# Patient Record
Sex: FEMALE | Race: WHITE | Employment: FULL TIME | URBAN - METROPOLITAN AREA
[De-identification: names, ages, dates, MRNs, and addresses within clinical notes are randomized per-mention and may not be internally consistent; named-entity substitution may affect disease eponyms.]

---

## 2017-01-18 ENCOUNTER — HOSPITAL ENCOUNTER (OUTPATIENT)
Dept: WOMENS IMAGING | Age: 47
Discharge: HOME | End: 2017-01-18
Attending: NURSE PRACTITIONER | Admitting: NURSE PRACTITIONER

## 2017-01-18 DIAGNOSIS — Z12.31 VISIT FOR SCREENING MAMMOGRAM: ICD-10-CM

## 2017-08-30 ENCOUNTER — HOSPITAL ENCOUNTER (OUTPATIENT)
Dept: GENERAL RADIOLOGY | Age: 47
Discharge: HOME | End: 2017-08-30

## 2017-08-30 DIAGNOSIS — M95.4 STERNAL DEFORMITY: ICD-10-CM

## 2017-10-25 ENCOUNTER — HOSPITAL ENCOUNTER (OUTPATIENT)
Dept: ULTRASOUND IMAGING | Age: 47
Discharge: HOME | End: 2017-10-25
Attending: NURSE PRACTITIONER | Admitting: NURSE PRACTITIONER

## 2017-10-25 DIAGNOSIS — M95.4 ACQUIRED DEFORMITY OF CHEST AND RIB: ICD-10-CM

## 2017-10-25 DIAGNOSIS — M95.4: ICD-10-CM

## 2018-03-29 ENCOUNTER — HOSPITAL ENCOUNTER (OUTPATIENT)
Dept: ULTRASOUND IMAGING | Age: 48
Discharge: HOME | End: 2018-03-29
Attending: NURSE PRACTITIONER | Admitting: NURSE PRACTITIONER

## 2018-03-29 DIAGNOSIS — R10.9 RIGHT FLANK PAIN: ICD-10-CM

## 2018-07-05 ENCOUNTER — HOSPITAL ENCOUNTER (OUTPATIENT)
Dept: GENERAL RADIOLOGY | Age: 48
Discharge: HOME | End: 2018-07-05
Attending: NURSE PRACTITIONER | Admitting: NURSE PRACTITIONER

## 2018-07-05 DIAGNOSIS — M54.50 MIDLINE LOW BACK PAIN WITHOUT SCIATICA, UNSPECIFIED CHRONICITY: ICD-10-CM

## 2018-07-19 ENCOUNTER — OFFICE VISIT (OUTPATIENT)
Dept: CARDIOLOGY CLINIC | Age: 48
End: 2018-07-19

## 2018-07-19 VITALS
SYSTOLIC BLOOD PRESSURE: 136 MMHG | BODY MASS INDEX: 46.93 KG/M2 | HEART RATE: 70 BPM | WEIGHT: 255 LBS | DIASTOLIC BLOOD PRESSURE: 82 MMHG | HEIGHT: 62 IN

## 2018-07-19 DIAGNOSIS — I27.20 PULMONARY HTN (HCC): ICD-10-CM

## 2018-07-19 DIAGNOSIS — R07.89 OTHER CHEST PAIN: ICD-10-CM

## 2018-07-19 DIAGNOSIS — I36.1 NON-RHEUMATIC TRICUSPID VALVE INSUFFICIENCY: ICD-10-CM

## 2018-07-19 DIAGNOSIS — R07.9 CHEST PAIN, UNSPECIFIED TYPE: Primary | ICD-10-CM

## 2018-07-19 PROCEDURE — 99213 OFFICE O/P EST LOW 20 MIN: CPT | Performed by: NURSE PRACTITIONER

## 2018-07-19 PROCEDURE — 93000 ELECTROCARDIOGRAM COMPLETE: CPT | Performed by: NURSE PRACTITIONER

## 2018-07-19 NOTE — PROGRESS NOTES
CARDIOLOGY  NOTE      7/19/2018    RE: Kym Reinoso  (1970)                               TO:  Dr. Ed Trinh MD      Thank you for involving me in taking care of your  patient Kym Reinoso, who is a  50y.o. year old female with past medical  history of  Chest pain. She is seen today for chest pain . She during this visit she complains of chest pain. She woke up on Saturday and had left sided chest pain. She describes it as a sharp pain that radiated to her jaw. It went away on its own. She notes since she has been tired. There have no further episodes. She notes she is congested as well. There is a family of history of CAD-father with MI, brother with MI early age. sister had CABG at age of 43. EKG today NSR no acute changes. Vitals:    07/19/18 1049   BP: 136/82   Pulse: 70       Current Outpatient Prescriptions   Medication Sig Dispense Refill    Multiple Vitamins-Minerals (MULTI COMPLETE PO) Take by mouth      Fluticasone Propionate (FLONASE NA) by Nasal route      cetirizine (ZYRTEC) 10 MG tablet Take 10 mg by mouth daily.  Omega-3 Fatty Acids (FISH OIL BURP-LESS PO) Take  by mouth.  sertraline (ZOLOFT) 50 MG tablet Take 50 mg by mouth daily.  omeprazole (PRILOSEC) 40 MG capsule Take 40 mg by mouth daily. No current facility-administered medications for this visit. Allergies: Tape [adhesive tape] and Ibuprofen  Past Medical History:   Diagnosis Date    Anemia     Anesthesia     Trouble waking up.     Arthritis     right knee-S/p Orthovisc injections X3    Blood pressure elevated without history of HTN 11/14/2013    per [address] visit- 124/90, 124/92    Blood transfusion Last 6/2011    No reaction    Cardiomegaly 10/13/2012    per CT chest-in Epic    Chronic sinusitis     Depression     Environmental allergies     Family history of early CAD     Sister- CABG- age 39; Father- Massive MI- age 43    H/O cardiovascular stress test 6/10/05     EF 69% Normal study.  H/O echocardiogram ; EF55% mild-mod MR,TR, mod pulm htn 2005/ EF 52%,Mild MR and TR    H/O gastric bypass     H/O hypoglycemia     History of cardiac cath     No signif.CAD.    HX OTHER MEDICAL 13    EVENT MONITOR: -Sinus rhythm, sinus bradycardia. 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  Seasonal allergies      Past Surgical History:   Procedure Laterality Date     SECTION  1996    x 1    CHOLECYSTECTOMY      Lap Choley    COLONOSCOPY      DIAGNOSTIC CARDIAC CATH LAB PROCEDURE  8/15/05    2005, No significant CAD    ENDOSCOPY, COLON, DIAGNOSTIC      GASTRIC BYPASS SURGERY  2006    pre-wt 341#lb    HYSTERECTOMY  8/15/14    LAVH with Right S and O    KNEE ARTHROSCOPY Right 14    medial/lateral menisectomy, chondroplasty    KNEE SURGERY      right - centered knee cap and scrapes bone spurs and arthritis.     WRIST SURGERY  2008    Left - fractured x 2     Family History   Problem Relation Age of Onset    Heart Disease Mother     Diabetes Mother     Cancer Mother         lung    Heart Disease Father         Massive MI    Early Death Father 43        Massive MI    Other Sister         pulmonary HTN, O2 ATC    Diabetes Sister     Heart Disease Sister 39        CABG at age 43    Cancer Brother 43        stomach - remission    Heart Disease Brother         MI, CABG at age 45   Kayla Slimmer High Cholesterol Brother     High Blood Pressure Brother     Pacemaker Brother         defibulator    Diabetes Daughter     High Blood Pressure Daughter     Other Daughter         Bladder reflux     Social History   Substance Use Topics    Pulmonary HTN and mild to moderate TR - Will check echo to eval progression    Family history  First degree relatives with early age CAD/ MI     Lifestyle modification, diet and exercise advised    F/U after testing

## 2018-08-01 ENCOUNTER — HOSPITAL ENCOUNTER (OUTPATIENT)
Dept: OTHER | Age: 48
Discharge: HOME | End: 2018-08-31
Attending: PREVENTIVE MEDICINE | Admitting: PHYSICAL MEDICINE & REHABILITATION

## 2018-08-02 ENCOUNTER — PROCEDURE VISIT (OUTPATIENT)
Dept: CARDIOLOGY CLINIC | Age: 48
End: 2018-08-02

## 2018-08-02 DIAGNOSIS — I27.20 PULMONARY HTN (HCC): ICD-10-CM

## 2018-08-02 DIAGNOSIS — R07.9 CHEST PAIN, UNSPECIFIED TYPE: Primary | ICD-10-CM

## 2018-08-02 LAB
LV EF: 58 %
LVEF MODALITY: NORMAL

## 2018-08-02 PROCEDURE — 93306 TTE W/DOPPLER COMPLETE: CPT | Performed by: INTERNAL MEDICINE

## 2018-08-07 ENCOUNTER — TELEPHONE (OUTPATIENT)
Dept: CARDIOLOGY CLINIC | Age: 48
End: 2018-08-07

## 2018-08-07 NOTE — TELEPHONE ENCOUNTER
Spoke with pt and gave echo results      Summary   Left ventricular systolic function is normal with an ejection fraction of   55-60%.  Grade I diastolic dysfunction.   Sclerotic, but non-stenotic aortic valve.   Doppler evaluation reveals mild mitral and mild to moderate tricuspid   regurgitation.   Right ventricular systolic pressure of 48 mmHg consistent with mild to   moderate pulmonary hypertension.   No evidence of pericardial effusion.

## 2018-08-09 ENCOUNTER — PROCEDURE VISIT (OUTPATIENT)
Dept: CARDIOLOGY CLINIC | Age: 48
End: 2018-08-09

## 2018-08-09 DIAGNOSIS — I27.20 PULMONARY HTN (HCC): ICD-10-CM

## 2018-08-09 DIAGNOSIS — R07.9 CHEST PAIN, UNSPECIFIED TYPE: ICD-10-CM

## 2018-08-09 LAB
LV EF: 68 %
LVEF MODALITY: NORMAL

## 2018-08-09 PROCEDURE — 93018 CV STRESS TEST I&R ONLY: CPT | Performed by: INTERNAL MEDICINE

## 2018-08-09 PROCEDURE — A9500 TC99M SESTAMIBI: HCPCS | Performed by: INTERNAL MEDICINE

## 2018-08-09 PROCEDURE — 78452 HT MUSCLE IMAGE SPECT MULT: CPT | Performed by: INTERNAL MEDICINE

## 2018-08-09 PROCEDURE — 93016 CV STRESS TEST SUPVJ ONLY: CPT | Performed by: INTERNAL MEDICINE

## 2018-08-09 PROCEDURE — 93017 CV STRESS TEST TRACING ONLY: CPT | Performed by: INTERNAL MEDICINE

## 2018-08-14 ENCOUNTER — TELEPHONE (OUTPATIENT)
Dept: CARDIOLOGY CLINIC | Age: 48
End: 2018-08-14

## 2018-08-14 NOTE — TELEPHONE ENCOUNTER
Pt advised to keep appt on 08/23/2018 to go over abnormal nm      Conclusions   Giovanna Herrmann physician Dr. Dinora Palafox .abnormal stress test, normal LVEF,    Myocardial perfusion scan shows moderate size, moderate intensity, partially    reversible perfusion defect in anterior wall noted. Breast tissue attenuation    artifact cannot be excluded.        Recommendation    OFFICE VISIT TO DISCUSS RESULTS

## 2018-08-15 ENCOUNTER — TELEPHONE (OUTPATIENT)
Dept: CARDIOLOGY CLINIC | Age: 48
End: 2018-08-15

## 2018-08-23 ENCOUNTER — OFFICE VISIT (OUTPATIENT)
Dept: CARDIOLOGY CLINIC | Age: 48
End: 2018-08-23

## 2018-08-23 VITALS
DIASTOLIC BLOOD PRESSURE: 80 MMHG | HEIGHT: 62 IN | HEART RATE: 72 BPM | BODY MASS INDEX: 47.73 KG/M2 | WEIGHT: 259.4 LBS | SYSTOLIC BLOOD PRESSURE: 122 MMHG

## 2018-08-23 DIAGNOSIS — R07.89 OTHER CHEST PAIN: Primary | ICD-10-CM

## 2018-08-23 PROCEDURE — 99214 OFFICE O/P EST MOD 30 MIN: CPT | Performed by: INTERNAL MEDICINE

## 2018-08-23 PROCEDURE — 1036F TOBACCO NON-USER: CPT | Performed by: INTERNAL MEDICINE

## 2018-08-23 PROCEDURE — G8417 CALC BMI ABV UP PARAM F/U: HCPCS | Performed by: INTERNAL MEDICINE

## 2018-08-23 PROCEDURE — G8427 DOCREV CUR MEDS BY ELIG CLIN: HCPCS | Performed by: INTERNAL MEDICINE

## 2018-08-28 ENCOUNTER — HOSPITAL ENCOUNTER (OUTPATIENT)
Dept: GENERAL RADIOLOGY | Age: 48
Discharge: HOME | End: 2018-08-28
Attending: INTERNAL MEDICINE | Admitting: INTERNAL MEDICINE

## 2018-08-28 DIAGNOSIS — Z01.818 PREOP TESTING: ICD-10-CM

## 2018-08-28 LAB
ANION GAP SERPL CALCULATED.3IONS-SCNC: 14 MMOL/L (ref 4–16)
APTT: 26.1 SECONDS (ref 21.2–33)
BASOPHILS ABSOLUTE: 0.1 K/CU MM
BASOPHILS RELATIVE PERCENT: 1.2 % (ref 0–1)
BUN BLDV-MCNC: 10 MG/DL (ref 6–23)
CALCIUM SERPL-MCNC: 8.6 MG/DL (ref 8.3–10.6)
CHLORIDE BLD-SCNC: 102 MMOL/L (ref 99–110)
CO2: 24 MMOL/L (ref 21–32)
CREAT SERPL-MCNC: 0.7 MG/DL (ref 0.6–1.1)
DIFFERENTIAL TYPE: ABNORMAL
EOSINOPHILS ABSOLUTE: 0.3 K/CU MM
EOSINOPHILS RELATIVE PERCENT: 6.2 % (ref 0–3)
GFR AFRICAN AMERICAN: >60 ML/MIN/1.73M2
GFR NON-AFRICAN AMERICAN: >60 ML/MIN/1.73M2
GLUCOSE BLD-MCNC: 88 MG/DL (ref 70–99)
HCT VFR BLD CALC: 39.9 % (ref 37–47)
HEMOGLOBIN: 12.3 GM/DL (ref 12.5–16)
IMMATURE NEUTROPHIL %: 0.4 % (ref 0–0.43)
INR BLD: 0.91 INDEX
LYMPHOCYTES ABSOLUTE: 1.5 K/CU MM
LYMPHOCYTES RELATIVE PERCENT: 29 % (ref 24–44)
MCH RBC QN AUTO: 28.1 PG (ref 27–31)
MCHC RBC AUTO-ENTMCNC: 30.8 % (ref 32–36)
MCV RBC AUTO: 91.3 FL (ref 78–100)
MONOCYTES ABSOLUTE: 0.4 K/CU MM
MONOCYTES RELATIVE PERCENT: 6.8 % (ref 0–4)
NUCLEATED RBC %: 0 %
PDW BLD-RTO: 15.2 % (ref 11.7–14.9)
PLATELET # BLD: 221 K/CU MM (ref 140–440)
PMV BLD AUTO: 9.8 FL (ref 7.5–11.1)
POTASSIUM SERPL-SCNC: 4.5 MMOL/L (ref 3.5–5.1)
PROTHROMBIN TIME: 10.4 SECONDS (ref 9.12–12.5)
RBC # BLD: 4.37 M/CU MM (ref 4.2–5.4)
SEGMENTED NEUTROPHILS ABSOLUTE COUNT: 2.9 K/CU MM
SEGMENTED NEUTROPHILS RELATIVE PERCENT: 56.4 % (ref 36–66)
SODIUM BLD-SCNC: 140 MMOL/L (ref 135–145)
TOTAL IMMATURE NEUTOROPHIL: 0.02 K/CU MM
TOTAL NUCLEATED RBC: 0 K/CU MM
WBC # BLD: 5.2 K/CU MM (ref 4–10.5)

## 2018-09-01 ENCOUNTER — HOSPITAL ENCOUNTER (OUTPATIENT)
Dept: OTHER | Age: 48
Discharge: HOME | End: 2018-09-01
Attending: PHYSICAL MEDICINE & REHABILITATION | Admitting: PHYSICAL MEDICINE & REHABILITATION

## 2018-09-11 ENCOUNTER — OFFICE VISIT (OUTPATIENT)
Dept: CARDIOLOGY CLINIC | Age: 48
End: 2018-09-11

## 2018-09-11 VITALS
BODY MASS INDEX: 47.04 KG/M2 | WEIGHT: 255.6 LBS | HEIGHT: 62 IN | DIASTOLIC BLOOD PRESSURE: 96 MMHG | SYSTOLIC BLOOD PRESSURE: 122 MMHG | HEART RATE: 96 BPM

## 2018-09-11 DIAGNOSIS — E66.01 OBESITY, MORBID (HCC): ICD-10-CM

## 2018-09-11 DIAGNOSIS — I50.30 DIASTOLIC CONGESTIVE HEART FAILURE, UNSPECIFIED HF CHRONICITY (HCC): Primary | ICD-10-CM

## 2018-09-11 PROCEDURE — 1036F TOBACCO NON-USER: CPT | Performed by: INTERNAL MEDICINE

## 2018-09-11 PROCEDURE — G8417 CALC BMI ABV UP PARAM F/U: HCPCS | Performed by: INTERNAL MEDICINE

## 2018-09-11 PROCEDURE — G8427 DOCREV CUR MEDS BY ELIG CLIN: HCPCS | Performed by: INTERNAL MEDICINE

## 2018-09-11 PROCEDURE — 99214 OFFICE O/P EST MOD 30 MIN: CPT | Performed by: INTERNAL MEDICINE

## 2018-09-11 RX ORDER — POTASSIUM CHLORIDE 1.5 G/1.77G
20 POWDER, FOR SOLUTION ORAL 2 TIMES DAILY
Qty: 30 EACH | Refills: 3 | Status: SHIPPED | OUTPATIENT
Start: 2018-09-11 | End: 2018-11-16 | Stop reason: SDUPTHER

## 2018-09-11 RX ORDER — FUROSEMIDE 20 MG/1
20 TABLET ORAL 2 TIMES DAILY
Qty: 120 TABLET | Refills: 3 | Status: SHIPPED | OUTPATIENT
Start: 2018-09-11 | End: 2018-11-16 | Stop reason: SDUPTHER

## 2018-09-11 RX ORDER — PHENTERMINE HYDROCHLORIDE 37.5 MG/1
37.5 TABLET ORAL
Qty: 30 TABLET | Refills: 0 | Status: SHIPPED | OUTPATIENT
Start: 2018-09-11 | End: 2018-10-22 | Stop reason: SDUPTHER

## 2018-09-11 NOTE — PROGRESS NOTES
Phares Gosselin, MD        OFFICE  FOLLOWUP NOTE    Chief complaints: patient is here for management of diastolic CHF, pulmonary HTN, obesity,migraine,depression    Reason for follow up, management of Elevated LVED and pulmonary HTN    Subjective:  no chest pain, mild shortness of breath, no dizziness, no palpitations    HPI Moni Coffey is a 50 y. o.year old who  has a past medical history of Anemia; Anesthesia; Arthritis; Blood pressure elevated without history of HTN; Blood transfusion; Cardiomegaly; Chronic sinusitis; Depression; Environmental allergies; Family history of early CAD; H/O cardiovascular stress test; H/O echocardiogram; H/O echocardiogram; H/O gastric bypass; H/O hypoglycemia; History of cardiac cath; History of cardiac cath; History of nuclear stress test; HX OTHER MEDICAL; Insomnia; Migraine headache; Obesity; Other bone involvement in diseases classified elsewhere; Presence of IVC filter; and Seasonal allergies. and presents for management of diastolic CHF, pulmonary HTN, obesity,migraine, depression which are not well controlled, she had LHC/RHC and has PCWP was 18 and RVSP was 66 mmHg      Current Outpatient Prescriptions   Medication Sig Dispense Refill    Multiple Vitamins-Minerals (MULTI COMPLETE PO) Take by mouth      Fluticasone Propionate (FLONASE NA) by Nasal route      cetirizine (ZYRTEC) 10 MG tablet Take 10 mg by mouth daily.  Omega-3 Fatty Acids (FISH OIL BURP-LESS PO) Take  by mouth.  sertraline (ZOLOFT) 50 MG tablet Take 50 mg by mouth daily.  omeprazole (PRILOSEC) 40 MG capsule Take 40 mg by mouth daily. No current facility-administered medications for this visit. Allergies: Tape [adhesive tape] and Ibuprofen  Past Medical History:   Diagnosis Date    Anemia     Anesthesia     Trouble waking up.     Arthritis     right knee-S/p Orthovisc injections X3    Blood pressure elevated without history of HTN 11/14/2013    per [facility] visit- 124/90, 124/92    Blood transfusion Last 2011    No reaction    Cardiomegaly 10/13/2012    per CT chest-in Epic    Chronic sinusitis     Depression     Environmental allergies     Family history of early CAD     Sister- CABG- age 39; Father- Massive MI- age 43    H/O cardiovascular stress test 6/10/05     EF 69% Normal study.  H/O echocardiogram ; EF55% mild-mod MR,TR, mod pulm htn / EF 52%,Mild MR and TR    H/O echocardiogram 2018    EF 40-97%  Grade 1 diastolic dysfunction, sclerotic, but non-stenotic aortic valve, Mild MR, Mild-Mod TR, Mild-Mod PHTN, no pericardial effusion    H/O gastric bypass     H/O hypoglycemia     History of cardiac cath     No signif.CAD.  History of cardiac cath 2018    normal coronaries    History of nuclear stress test 08/10/2018    HX OTHER MEDICAL 13    EVENT MONITOR: -Sinus rhythm, sinus bradycardia. 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  Seasonal allergies      Past Surgical History:   Procedure Laterality Date     SECTION  1996    x 1    CHOLECYSTECTOMY  1996    Lap Choley    COLONOSCOPY      DIAGNOSTIC CARDIAC CATH LAB PROCEDURE  8/15/05    2005, No significant CAD    ENDOSCOPY, COLON, DIAGNOSTIC      GASTRIC BYPASS SURGERY  2006    pre-wt 341#lb    HYSTERECTOMY  8/15/14    LAVH with Right S and O    KNEE ARTHROSCOPY Right 14    medial/lateral menisectomy, chondroplasty    KNEE SURGERY  2006    right - centered knee cap and scrapes bone spurs and arthritis.     WRIST SURGERY  2008    Left - fractured x 2     Family History   Problem Relation Age of Onset    Heart Disease Mother     Diabetes Mother     Cancer Mother         lung    Heart Disease Father Massive MI    Early Death Father 43        Massive MI    Other Sister         pulmonary HTN, O2 ATC    Diabetes Sister     Heart Disease Sister 39        CABG at age 43    Cancer Brother 43        stomach - remission    Heart Disease Brother         MI, CABG at age 45    High Cholesterol Brother     High Blood Pressure Brother     Pacemaker Brother         defibulator    Diabetes Daughter     High Blood Pressure Daughter     Other Daughter         Bladder reflux     Social History   Substance Use Topics    Smoking status: Passive Smoke Exposure - Never Smoker    Smokeless tobacco: Never Used      Comment: Exposed to second hand smoke only.  Alcohol use Yes      Comment: social      @[email or web address]@  Review of Systems:   · Constitutional: No Fever or Weight Loss   · Eyes: No Decreased Vision  · ENT: No Headaches, Hearing Loss or Vertigo  · Cardiovascular: No chest pain, dyspnea on exertion, palpitations or loss of consciousness  · Respiratory: No cough or wheezing    · Gastrointestinal: No abdominal pain, appetite loss, blood in stools, constipation, diarrhea or heartburn  · Genitourinary: No dysuria, trouble voiding, or hematuria  · Musculoskeletal:  No gait disturbance, weakness or joint complaints  · Integumentary: No rash or pruritis  · Neurological: No TIA or stroke symptoms  · Psychiatric: No anxiety or depression  · Endocrine: No malaise, fatigue or temperature intolerance  · Hematologic/Lymphatic: No bleeding problems, blood clots or swollen lymph nodes  · Allergic/Immunologic: No nasal congestion or hives  All systems negative except as marked.    Objective:  BP (!) 122/96 (Site: Left Upper Arm, Position: Sitting, Cuff Size: Large Adult)   Pulse 96   Ht 5' 2\" (1.575 m)   Wt 255 lb 9.6 oz (115.9 kg)   LMP 06/04/2012   BMI 46.75 kg/m²   Wt Readings from Last 3 Encounters:   09/11/18 255 lb 9.6 oz (115.9 kg)   08/30/18 259 lb (117.5 kg)   08/23/18 259 lb 6.4 oz (117.7 kg)     Body mass index is Component Value Date    ALT 15 06/01/2016    AST 20 06/01/2016     TSH:  No results found for: TSH    Impression:  Rosa Vasquez is a 50 y. o.year old who  has a past medical history of Anemia; Anesthesia; Arthritis; Blood pressure elevated without history of HTN; Blood transfusion; Cardiomegaly; Chronic sinusitis; Depression; Environmental allergies; Family history of early CAD; H/O cardiovascular stress test; H/O echocardiogram; H/O echocardiogram; H/O gastric bypass; H/O hypoglycemia; History of cardiac cath; History of cardiac cath; History of nuclear stress test; HX OTHER MEDICAL; Insomnia; Migraine headache; Obesity; Other bone involvement in diseases classified elsewhere; Presence of IVC filter; and Seasonal allergies. and presents with     Plan:  1. Diastolic CHF and pulmonary HTN: add lasix and low dose KCL. Will repeat RHC in one yr  2. Obesity: add adipex, s.p gastric bypass'  3. Migraine: stable, off medications  4. Depression:stable, continue zoloft  5. Health maintenance: exerise and dietHealth maintenance: exerise and diet  All labs, medications and tests reviewed, continue all other medications of all above medical condition listed as is.

## 2018-09-25 ENCOUNTER — HOSPITAL ENCOUNTER (OUTPATIENT)
Dept: PSYCHIATRY | Age: 48
Setting detail: THERAPIES SERIES
Discharge: HOME | End: 2018-09-25
Payer: COMMERCIAL

## 2018-09-25 PROCEDURE — 90834 PSYTX W PT 45 MINUTES: CPT

## 2018-10-02 ENCOUNTER — HOSPITAL ENCOUNTER (OUTPATIENT)
Dept: PSYCHIATRY | Age: 48
Setting detail: THERAPIES SERIES
Discharge: HOME | End: 2018-10-02
Payer: COMMERCIAL

## 2018-10-02 PROCEDURE — 90834 PSYTX W PT 45 MINUTES: CPT

## 2018-10-04 ENCOUNTER — HOSPITAL ENCOUNTER (OUTPATIENT)
Dept: PSYCHIATRY | Age: 48
Setting detail: THERAPIES SERIES
Discharge: HOME | End: 2018-10-04
Payer: COMMERCIAL

## 2018-10-04 PROCEDURE — 90853 GROUP PSYCHOTHERAPY: CPT

## 2018-10-09 ENCOUNTER — APPOINTMENT (OUTPATIENT)
Dept: PSYCHIATRY | Age: 48
End: 2018-10-09
Payer: COMMERCIAL

## 2018-10-09 ENCOUNTER — HOSPITAL ENCOUNTER (OUTPATIENT)
Dept: PSYCHIATRY | Age: 48
Setting detail: THERAPIES SERIES
Discharge: HOME | End: 2018-10-09
Payer: COMMERCIAL

## 2018-10-09 PROCEDURE — 90834 PSYTX W PT 45 MINUTES: CPT

## 2018-10-09 PROCEDURE — 80305 DRUG TEST PRSMV DIR OPT OBS: CPT

## 2018-10-11 ENCOUNTER — APPOINTMENT (OUTPATIENT)
Dept: CT IMAGING | Age: 48
End: 2018-10-11
Payer: COMMERCIAL

## 2018-10-11 ENCOUNTER — APPOINTMENT (OUTPATIENT)
Dept: PSYCHIATRY | Age: 48
End: 2018-10-11
Payer: COMMERCIAL

## 2018-10-11 ENCOUNTER — OFFICE VISIT (OUTPATIENT)
Dept: CARDIOLOGY CLINIC | Age: 48
End: 2018-10-11
Payer: COMMERCIAL

## 2018-10-11 ENCOUNTER — HOSPITAL ENCOUNTER (EMERGENCY)
Age: 48
Discharge: HOME | End: 2018-10-11
Attending: EMERGENCY MEDICINE
Payer: COMMERCIAL

## 2018-10-11 VITALS
OXYGEN SATURATION: 100 % | BODY MASS INDEX: 45.08 KG/M2 | RESPIRATION RATE: 16 BRPM | HEART RATE: 73 BPM | TEMPERATURE: 97.8 F | HEIGHT: 62 IN | SYSTOLIC BLOOD PRESSURE: 110 MMHG | WEIGHT: 245 LBS | DIASTOLIC BLOOD PRESSURE: 75 MMHG

## 2018-10-11 VITALS
WEIGHT: 247 LBS | BODY MASS INDEX: 45.45 KG/M2 | SYSTOLIC BLOOD PRESSURE: 110 MMHG | DIASTOLIC BLOOD PRESSURE: 80 MMHG | HEIGHT: 62 IN | HEART RATE: 80 BPM

## 2018-10-11 DIAGNOSIS — R07.89 OTHER CHEST PAIN: ICD-10-CM

## 2018-10-11 DIAGNOSIS — E78.5 HYPERLIPIDEMIA, UNSPECIFIED HYPERLIPIDEMIA TYPE: ICD-10-CM

## 2018-10-11 DIAGNOSIS — R10.9 ABDOMINAL PAIN, UNSPECIFIED ABDOMINAL LOCATION: Primary | ICD-10-CM

## 2018-10-11 DIAGNOSIS — I27.20 PULMONARY HTN (HCC): Primary | ICD-10-CM

## 2018-10-11 LAB
ALBUMIN SERPL-MCNC: 3.5 GM/DL (ref 3.4–5)
ALP BLD-CCNC: 121 IU/L (ref 40–129)
ALT SERPL-CCNC: 17 U/L (ref 10–40)
ANION GAP SERPL CALCULATED.3IONS-SCNC: 13 MMOL/L (ref 4–16)
AST SERPL-CCNC: 18 IU/L (ref 15–37)
BASOPHILS ABSOLUTE: 0.1 K/CU MM
BASOPHILS RELATIVE PERCENT: 0.7 % (ref 0–1)
BILIRUB SERPL-MCNC: 0.6 MG/DL (ref 0–1)
BUN BLDV-MCNC: 9 MG/DL (ref 6–23)
CALCIUM SERPL-MCNC: 9 MG/DL (ref 8.3–10.6)
CHLORIDE BLD-SCNC: 99 MMOL/L (ref 99–110)
CO2: 23 MMOL/L (ref 21–32)
CREAT SERPL-MCNC: 0.9 MG/DL (ref 0.6–1.1)
DIFFERENTIAL TYPE: ABNORMAL
EOSINOPHILS ABSOLUTE: 0.4 K/CU MM
EOSINOPHILS RELATIVE PERCENT: 4.6 % (ref 0–3)
GFR AFRICAN AMERICAN: >60 ML/MIN/1.73M2
GFR NON-AFRICAN AMERICAN: >60 ML/MIN/1.73M2
GLUCOSE BLD-MCNC: 117 MG/DL (ref 70–99)
HCT VFR BLD CALC: 40.8 % (ref 37–47)
HEMOGLOBIN: 13 GM/DL (ref 12.5–16)
IMMATURE NEUTROPHIL %: 0.2 % (ref 0–0.43)
LIPASE: 33 IU/L (ref 13–60)
LYMPHOCYTES ABSOLUTE: 1.5 K/CU MM
LYMPHOCYTES RELATIVE PERCENT: 17.8 % (ref 24–44)
MCH RBC QN AUTO: 28.8 PG (ref 27–31)
MCHC RBC AUTO-ENTMCNC: 31.9 % (ref 32–36)
MCV RBC AUTO: 90.3 FL (ref 78–100)
MONOCYTES ABSOLUTE: 0.4 K/CU MM
MONOCYTES RELATIVE PERCENT: 5 % (ref 0–4)
NUCLEATED RBC %: 0 %
PDW BLD-RTO: 15.4 % (ref 11.7–14.9)
PLATELET # BLD: 209 K/CU MM (ref 140–440)
PMV BLD AUTO: 10.1 FL (ref 7.5–11.1)
POTASSIUM SERPL-SCNC: 3.8 MMOL/L (ref 3.5–5.1)
RBC # BLD: 4.52 M/CU MM (ref 4.2–5.4)
SEGMENTED NEUTROPHILS ABSOLUTE COUNT: 6 K/CU MM
SEGMENTED NEUTROPHILS RELATIVE PERCENT: 71.7 % (ref 36–66)
SODIUM BLD-SCNC: 135 MMOL/L (ref 135–145)
TOTAL IMMATURE NEUTOROPHIL: 0.02 K/CU MM
TOTAL NUCLEATED RBC: 0 K/CU MM
TOTAL PROTEIN: 7.5 GM/DL (ref 6.4–8.2)
WBC # BLD: 8.4 K/CU MM (ref 4–10.5)

## 2018-10-11 PROCEDURE — 36415 COLL VENOUS BLD VENIPUNCTURE: CPT

## 2018-10-11 PROCEDURE — 6370000000 HC RX 637 (ALT 250 FOR IP): Performed by: EMERGENCY MEDICINE

## 2018-10-11 PROCEDURE — 83690 ASSAY OF LIPASE: CPT

## 2018-10-11 PROCEDURE — 74177 CT ABD & PELVIS W/CONTRAST: CPT

## 2018-10-11 PROCEDURE — 85025 COMPLETE CBC W/AUTO DIFF WBC: CPT

## 2018-10-11 PROCEDURE — 96374 THER/PROPH/DIAG INJ IV PUSH: CPT

## 2018-10-11 PROCEDURE — 96361 HYDRATE IV INFUSION ADD-ON: CPT

## 2018-10-11 PROCEDURE — 80053 COMPREHEN METABOLIC PANEL: CPT

## 2018-10-11 PROCEDURE — 6360000004 HC RX CONTRAST MEDICATION: Performed by: EMERGENCY MEDICINE

## 2018-10-11 PROCEDURE — 99284 EMERGENCY DEPT VISIT MOD MDM: CPT

## 2018-10-11 PROCEDURE — 2500000003 HC RX 250 WO HCPCS: Performed by: EMERGENCY MEDICINE

## 2018-10-11 PROCEDURE — S0028 INJECTION, FAMOTIDINE, 20 MG: HCPCS | Performed by: EMERGENCY MEDICINE

## 2018-10-11 PROCEDURE — 2580000003 HC RX 258: Performed by: EMERGENCY MEDICINE

## 2018-10-11 PROCEDURE — 99213 OFFICE O/P EST LOW 20 MIN: CPT | Performed by: NURSE PRACTITIONER

## 2018-10-11 RX ORDER — 0.9 % SODIUM CHLORIDE 0.9 %
1000 INTRAVENOUS SOLUTION INTRAVENOUS ONCE
Status: COMPLETED | OUTPATIENT
Start: 2018-10-11 | End: 2018-10-11

## 2018-10-11 RX ORDER — FAMOTIDINE 20 MG/1
20 TABLET, FILM COATED ORAL 2 TIMES DAILY PRN
Qty: 60 TABLET | Refills: 0 | Status: SHIPPED | OUTPATIENT
Start: 2018-10-11 | End: 2020-11-18

## 2018-10-11 RX ORDER — MAGNESIUM HYDROXIDE/ALUMINUM HYDROXICE/SIMETHICONE 120; 1200; 1200 MG/30ML; MG/30ML; MG/30ML
30 SUSPENSION ORAL ONCE
Status: COMPLETED | OUTPATIENT
Start: 2018-10-11 | End: 2018-10-11

## 2018-10-11 RX ORDER — 0.9 % SODIUM CHLORIDE 0.9 %
10 VIAL (ML) INJECTION
Status: COMPLETED | OUTPATIENT
Start: 2018-10-11 | End: 2018-10-11

## 2018-10-11 RX ORDER — PHENOBARBITAL, HYOSCYAMINE SULFATE, ATROPINE SULFATE, SCOPOLAMINE HYDROBROMIDE .0194; .1037; 16.2; .0065 MG/5ML; MG/5ML; MG/5ML; MG/5ML
5 ELIXIR ORAL ONCE
Status: COMPLETED | OUTPATIENT
Start: 2018-10-11 | End: 2018-10-11

## 2018-10-11 RX ORDER — MAGNESIUM HYDROXIDE/ALUMINUM HYDROXICE/SIMETHICONE 120; 1200; 1200 MG/30ML; MG/30ML; MG/30ML
10 SUSPENSION ORAL EVERY 8 HOURS PRN
Qty: 1 BOTTLE | Refills: 0 | Status: SHIPPED | OUTPATIENT
Start: 2018-10-11 | End: 2020-11-18

## 2018-10-11 RX ADMIN — FAMOTIDINE 20 MG: 10 INJECTION, SOLUTION INTRAVENOUS at 13:36

## 2018-10-11 RX ADMIN — IOHEXOL 50 ML: 240 INJECTION, SOLUTION INTRATHECAL; INTRAVASCULAR; INTRAVENOUS; ORAL at 16:41

## 2018-10-11 RX ADMIN — SODIUM CHLORIDE 1000 ML: 9 INJECTION, SOLUTION INTRAVENOUS at 13:39

## 2018-10-11 RX ADMIN — Medication 5 ML: at 13:28

## 2018-10-11 RX ADMIN — SODIUM CHLORIDE, PRESERVATIVE FREE 10 ML: 5 INJECTION INTRAVENOUS at 16:40

## 2018-10-11 RX ADMIN — IOPAMIDOL 80 ML: 755 INJECTION, SOLUTION INTRAVENOUS at 16:41

## 2018-10-11 RX ADMIN — ALUMINUM HYDROXIDE, MAGNESIUM HYDROXIDE, AND SIMETHICONE 30 ML: 200; 200; 20 SUSPENSION ORAL at 13:27

## 2018-10-11 RX ADMIN — LIDOCAINE HYDROCHLORIDE 15 ML: 20 SOLUTION ORAL; TOPICAL at 13:27

## 2018-10-11 ASSESSMENT — PAIN DESCRIPTION - PAIN TYPE: TYPE: ACUTE PAIN

## 2018-10-11 ASSESSMENT — PAIN SCALES - GENERAL: PAINLEVEL_OUTOF10: 9

## 2018-10-11 NOTE — ED NOTES
Pt states she was at her Cardiology office this morning for visit.       Eugene Loya RN  10/11/18 0195

## 2018-10-11 NOTE — ED PROVIDER NOTES
- 60 IU/L     CT ABDOMEN PELVIS W IV CONTRAST Additional Contrast? Oral   Final Result   No CT evidence of acute intra-abdominal process. Reticulonodular opacities within the medial basal segment of the right lower   lobe may represent focal pneumonia or bronchiolitis. Centrilobular emphysematous changes. MDM:  CT imaging reveals no acute findings at this time. Laboratory studies were reviewed and revealed no acute abnormalities. Patient reported feeling much better after symptomatic treatment here. She feels stable for discharge home with gastroenterology follow-up. Contact information provided. Prescription provided for Pepcid and Maalox. Return precautions discussed and patient voiced understanding. Patient discharged in stable condition. Final Impression:  1. Abdominal pain, unspecified abdominal location      Please note that portions of this note may have been complete with a voice recognition program.  Efforts were made to edit the dictations, but occasional words are mis-transcribed.           Dwayne Young DO  10/12/18 0206
Neutrophil 0.02 K/CU MM    Immature Neutrophil % 0.2 0 - 0.43 %   CMP   Result Value Ref Range    Sodium 135 135 - 145 MMOL/L    Potassium 3.8 3.5 - 5.1 MMOL/L    Chloride 99 99 - 110 mMol/L    CO2 23 21 - 32 MMOL/L    BUN 9 6 - 23 MG/DL    CREATININE 0.9 0.6 - 1.1 MG/DL    Glucose 117 (H) 70 - 99 MG/DL    Calcium 9.0 8.3 - 10.6 MG/DL    Alb 3.5 3.4 - 5.0 GM/DL    Total Protein 7.5 6.4 - 8.2 GM/DL    Total Bilirubin 0.6 0.0 - 1.0 MG/DL    ALT 17 10 - 40 U/L    AST 18 15 - 37 IU/L    Alkaline Phosphatase 121 40 - 129 IU/L    GFR Non-African American >60 >60 mL/min/1.73m2    GFR African American >60 >60 mL/min/1.73m2    Anion Gap 13 4 - 16   Lipase   Result Value Ref Range    Lipase 33 13 - 60 IU/L      Radiographs (if obtained):  [] The following radiograph was interpreted by myself in the absence of a radiologist:   [x] Radiologist's Report Reviewed:  CT ABDOMEN PELVIS W IV CONTRAST Additional Contrast? Oral   Final Result   No CT evidence of acute intra-abdominal process. Reticulonodular opacities within the medial basal segment of the right lower   lobe may represent focal pneumonia or bronchiolitis. Centrilobular emphysematous changes. Chart review shows recent radiographs:  No results found. MDM:  44-year-old female complaining of abdominal discomfort nausea and vomiting. Given history and physical likely esophageal reflux however we'll evaluate for pancreatitis versus ileus versus biliary pathology versus ulcerative disease. CMP noted to be unremarkable for biliary obstruction. Patient reporting improvement of symptoms with GI cocktail. CAT scan ordered and awaiting results. 4:00p.m. I have signed out [facility] Emergency Department care to [facility]. We discussed the pertinent history, physical exam, completed/pending test results (if applicable) and current treatment plan.  Please refer to his/her chart for the patients remaining Emergency Department course and final

## 2018-10-11 NOTE — PROGRESS NOTES
mucosa  Neck - Supple. No jugular venous distention noted. No carotid bruits. Cardiovascular - Normal S1 and S2   No murmur appreciated, No gallop. Extremities - No cyanosis, clubbing, no edema to lower legs. Pulmonary - No respiratory distress. No wheezes or rales. Chest is clear    Pulses: Bilateral radial and pedal pulses normal  Abdomen - no tenderness  Musculoskeletal - normal strength  Neurologic - There are no gross focal neurologic abnormalities. Skin-  No rash  Affect- normal mood    DATA:  Lab Results   Component Value Date    TROPONINI 0.108 03/13/2014    TROPONINI 0.008 06/27/2011     BNP:  No results found for: BNP  PT/INR:  No results found for: PTINR  No results found for: LABA1C  Lab Results   Component Value Date    CHOL 216 11/08/2013    TRIG 95 11/08/2013    HDL 63 11/08/2013    LDLCALC 134 11/08/2013    LDLDIRECT 120 (H) 04/23/2012     Lab Results   Component Value Date    ALT 15 06/01/2016    AST 20 06/01/2016     TSH:  No results found for: TSH      Assessment/ Plan:    Patient seen, interviewed and examined. Testing was reviewed. Chest pain    HTN  - Pulmonary HTN  Controlled  Current medications include: lasx  She is to continue current medications   advised low salt diet     Hyperlipidemia  Current medications include: omega three fatty acid  She is to continue current medications      CHF diastolic   Appears euvolemic   Cont with diuresis, B blocker and ACEI/ ARNI   Monitor weight and I/O's     GERD  Has had some reflux recently - referred to GI     Obesity  Using Adipex for weight loss  Diet  -Low calorie   Exercise at least 4 times per week   return in 4 weeks for further evaluation      Patient is encouraged to exercise even a brisk walk for 30 minutes at least 3 to 4 times a week. Lifestyle and risk factor modificatons discussed. Various goals are discussed and questions answered. Continue current medications. Appropriate prescriptions are addressed.   Questions answered and

## 2018-10-16 ENCOUNTER — APPOINTMENT (OUTPATIENT)
Dept: PSYCHIATRY | Age: 48
End: 2018-10-16
Payer: COMMERCIAL

## 2018-10-16 ENCOUNTER — HOSPITAL ENCOUNTER (OUTPATIENT)
Dept: PSYCHIATRY | Age: 48
Setting detail: THERAPIES SERIES
Discharge: HOME | End: 2018-10-16
Payer: COMMERCIAL

## 2018-10-16 PROCEDURE — 90834 PSYTX W PT 45 MINUTES: CPT

## 2018-10-17 ENCOUNTER — ANESTHESIA EVENT (OUTPATIENT)
Dept: OPERATING ROOM | Age: 48
End: 2018-10-17
Payer: COMMERCIAL

## 2018-10-18 ENCOUNTER — ANESTHESIA (OUTPATIENT)
Dept: OPERATING ROOM | Age: 48
End: 2018-10-18
Payer: COMMERCIAL

## 2018-10-18 ENCOUNTER — APPOINTMENT (OUTPATIENT)
Dept: PSYCHIATRY | Age: 48
End: 2018-10-18
Payer: COMMERCIAL

## 2018-10-18 ENCOUNTER — HOSPITAL ENCOUNTER (OUTPATIENT)
Age: 48
Setting detail: OUTPATIENT SURGERY
Discharge: HOME | End: 2018-10-18
Attending: INTERNAL MEDICINE | Admitting: INTERNAL MEDICINE
Payer: COMMERCIAL

## 2018-10-18 VITALS
TEMPERATURE: 97.1 F | WEIGHT: 246 LBS | BODY MASS INDEX: 45.27 KG/M2 | HEIGHT: 62 IN | OXYGEN SATURATION: 100 % | DIASTOLIC BLOOD PRESSURE: 81 MMHG | RESPIRATION RATE: 16 BRPM | HEART RATE: 60 BPM | SYSTOLIC BLOOD PRESSURE: 114 MMHG

## 2018-10-18 VITALS — DIASTOLIC BLOOD PRESSURE: 31 MMHG | OXYGEN SATURATION: 96 % | SYSTOLIC BLOOD PRESSURE: 126 MMHG

## 2018-10-18 PROCEDURE — 2709999900 HC NON-CHARGEABLE SUPPLY: Performed by: INTERNAL MEDICINE

## 2018-10-18 PROCEDURE — 3609012800 HC EGD DIAGNOSTIC ONLY: Performed by: INTERNAL MEDICINE

## 2018-10-18 PROCEDURE — 3700000000 HC ANESTHESIA ATTENDED CARE: Performed by: INTERNAL MEDICINE

## 2018-10-18 PROCEDURE — 2500000003 HC RX 250 WO HCPCS: Performed by: NURSE ANESTHETIST, CERTIFIED REGISTERED

## 2018-10-18 PROCEDURE — 6360000002 HC RX W HCPCS: Performed by: NURSE ANESTHETIST, CERTIFIED REGISTERED

## 2018-10-18 PROCEDURE — 3700000001 HC ADD 15 MINUTES (ANESTHESIA): Performed by: INTERNAL MEDICINE

## 2018-10-18 PROCEDURE — 7100000011 HC PHASE II RECOVERY - ADDTL 15 MIN: Performed by: INTERNAL MEDICINE

## 2018-10-18 PROCEDURE — 7100000010 HC PHASE II RECOVERY - FIRST 15 MIN: Performed by: INTERNAL MEDICINE

## 2018-10-18 PROCEDURE — 2580000003 HC RX 258: Performed by: INTERNAL MEDICINE

## 2018-10-18 RX ORDER — LIDOCAINE HYDROCHLORIDE 20 MG/ML
INJECTION, SOLUTION EPIDURAL; INFILTRATION; INTRACAUDAL; PERINEURAL PRN
Status: DISCONTINUED | OUTPATIENT
Start: 2018-10-18 | End: 2018-10-18 | Stop reason: SDUPTHER

## 2018-10-18 RX ORDER — SODIUM CHLORIDE, SODIUM LACTATE, POTASSIUM CHLORIDE, CALCIUM CHLORIDE 600; 310; 30; 20 MG/100ML; MG/100ML; MG/100ML; MG/100ML
INJECTION, SOLUTION INTRAVENOUS CONTINUOUS
Status: DISCONTINUED | OUTPATIENT
Start: 2018-10-18 | End: 2018-10-18 | Stop reason: HOSPADM

## 2018-10-18 RX ORDER — PROPOFOL 10 MG/ML
INJECTION, EMULSION INTRAVENOUS PRN
Status: DISCONTINUED | OUTPATIENT
Start: 2018-10-18 | End: 2018-10-18 | Stop reason: SDUPTHER

## 2018-10-18 RX ADMIN — PROPOFOL 50 MG: 10 INJECTION, EMULSION INTRAVENOUS at 09:11

## 2018-10-18 RX ADMIN — SODIUM CHLORIDE, POTASSIUM CHLORIDE, SODIUM LACTATE AND CALCIUM CHLORIDE: 600; 310; 30; 20 INJECTION, SOLUTION INTRAVENOUS at 08:23

## 2018-10-18 RX ADMIN — PROPOFOL 50 MG: 10 INJECTION, EMULSION INTRAVENOUS at 09:12

## 2018-10-18 RX ADMIN — PROPOFOL 100 MG: 10 INJECTION, EMULSION INTRAVENOUS at 09:10

## 2018-10-18 RX ADMIN — LIDOCAINE HYDROCHLORIDE 100 MG: 20 INJECTION, SOLUTION EPIDURAL; INFILTRATION; INTRACAUDAL; PERINEURAL at 09:10

## 2018-10-18 ASSESSMENT — PAIN SCALES - GENERAL
PAINLEVEL_OUTOF10: 0
PAINLEVEL_OUTOF10: 0

## 2018-10-18 ASSESSMENT — PAIN - FUNCTIONAL ASSESSMENT: PAIN_FUNCTIONAL_ASSESSMENT: 0-10

## 2018-10-22 ENCOUNTER — TELEPHONE (OUTPATIENT)
Dept: CARDIOLOGY CLINIC | Age: 48
End: 2018-10-22

## 2018-10-22 DIAGNOSIS — E66.01 OBESITY, MORBID (HCC): ICD-10-CM

## 2018-10-22 RX ORDER — PHENTERMINE HYDROCHLORIDE 37.5 MG/1
37.5 TABLET ORAL
Qty: 30 TABLET | Refills: 0 | OUTPATIENT
Start: 2018-10-22 | End: 2018-11-09 | Stop reason: SDUPTHER

## 2018-10-22 NOTE — TELEPHONE ENCOUNTER
Returned the call to the patient need to know what [facility] to send the prescription to??? Left a message for patient to call us back.

## 2018-10-22 NOTE — TELEPHONE ENCOUNTER
Patient calling stating she was told at office visit last week that her [facility]ipe[facility] would [facility]. [facility] states [facility] not [facility]. Please advise.

## 2018-10-23 ENCOUNTER — APPOINTMENT (OUTPATIENT)
Dept: PSYCHIATRY | Age: 48
End: 2018-10-23
Payer: COMMERCIAL

## 2018-10-23 ENCOUNTER — HOSPITAL ENCOUNTER (OUTPATIENT)
Dept: PSYCHIATRY | Age: 48
Setting detail: THERAPIES SERIES
Discharge: HOME | End: 2018-10-23
Payer: COMMERCIAL

## 2018-10-23 PROCEDURE — 90853 GROUP PSYCHOTHERAPY: CPT

## 2018-10-23 PROCEDURE — 90834 PSYTX W PT 45 MINUTES: CPT

## 2018-10-25 ENCOUNTER — HOSPITAL ENCOUNTER (OUTPATIENT)
Dept: PSYCHIATRY | Age: 48
Setting detail: THERAPIES SERIES
Discharge: HOME | End: 2018-10-25
Payer: COMMERCIAL

## 2018-10-25 PROCEDURE — 90853 GROUP PSYCHOTHERAPY: CPT

## 2018-10-30 ENCOUNTER — APPOINTMENT (OUTPATIENT)
Dept: PSYCHIATRY | Age: 48
End: 2018-10-30
Payer: COMMERCIAL

## 2018-10-30 ENCOUNTER — HOSPITAL ENCOUNTER (OUTPATIENT)
Dept: PSYCHIATRY | Age: 48
Setting detail: THERAPIES SERIES
Discharge: HOME | End: 2018-10-30
Payer: COMMERCIAL

## 2018-10-30 PROCEDURE — 90834 PSYTX W PT 45 MINUTES: CPT

## 2018-11-01 ENCOUNTER — HOSPITAL ENCOUNTER (OUTPATIENT)
Dept: PSYCHIATRY | Age: 48
Setting detail: THERAPIES SERIES
Discharge: HOME | End: 2018-11-01
Payer: COMMERCIAL

## 2018-11-01 PROCEDURE — 90853 GROUP PSYCHOTHERAPY: CPT

## 2018-11-06 ENCOUNTER — HOSPITAL ENCOUNTER (OUTPATIENT)
Dept: PSYCHIATRY | Age: 48
Setting detail: THERAPIES SERIES
Discharge: HOME | End: 2018-11-06
Payer: COMMERCIAL

## 2018-11-06 PROCEDURE — 90832 PSYTX W PT 30 MINUTES: CPT

## 2018-11-08 ENCOUNTER — HOSPITAL ENCOUNTER (OUTPATIENT)
Dept: PSYCHIATRY | Age: 48
Setting detail: THERAPIES SERIES
Discharge: HOME | End: 2018-11-08
Payer: COMMERCIAL

## 2018-11-08 PROCEDURE — 90853 GROUP PSYCHOTHERAPY: CPT

## 2018-11-09 ENCOUNTER — OFFICE VISIT (OUTPATIENT)
Dept: CARDIOLOGY CLINIC | Age: 48
End: 2018-11-09
Payer: COMMERCIAL

## 2018-11-09 VITALS
SYSTOLIC BLOOD PRESSURE: 124 MMHG | HEIGHT: 62 IN | DIASTOLIC BLOOD PRESSURE: 78 MMHG | WEIGHT: 247 LBS | BODY MASS INDEX: 45.45 KG/M2 | HEART RATE: 66 BPM

## 2018-11-09 DIAGNOSIS — E66.01 OBESITY, MORBID (HCC): ICD-10-CM

## 2018-11-09 PROCEDURE — G8484 FLU IMMUNIZE NO ADMIN: HCPCS | Performed by: INTERNAL MEDICINE

## 2018-11-09 PROCEDURE — G8417 CALC BMI ABV UP PARAM F/U: HCPCS | Performed by: INTERNAL MEDICINE

## 2018-11-09 PROCEDURE — 99214 OFFICE O/P EST MOD 30 MIN: CPT | Performed by: INTERNAL MEDICINE

## 2018-11-09 PROCEDURE — 1036F TOBACCO NON-USER: CPT | Performed by: INTERNAL MEDICINE

## 2018-11-09 PROCEDURE — G8427 DOCREV CUR MEDS BY ELIG CLIN: HCPCS | Performed by: INTERNAL MEDICINE

## 2018-11-09 RX ORDER — PHENTERMINE HYDROCHLORIDE 37.5 MG/1
37.5 TABLET ORAL
Qty: 30 TABLET | Refills: 0 | Status: SHIPPED | OUTPATIENT
Start: 2018-11-09 | End: 2018-12-09

## 2018-11-16 DIAGNOSIS — I50.30 DIASTOLIC CONGESTIVE HEART FAILURE, UNSPECIFIED HF CHRONICITY (HCC): ICD-10-CM

## 2018-11-16 RX ORDER — FUROSEMIDE 20 MG/1
20 TABLET ORAL 2 TIMES DAILY
Qty: 180 TABLET | Refills: 3 | Status: SHIPPED | OUTPATIENT
Start: 2018-11-16 | End: 2019-07-03 | Stop reason: SDUPTHER

## 2018-11-16 RX ORDER — POTASSIUM CHLORIDE 1.5 G/1.77G
20 POWDER, FOR SOLUTION ORAL 2 TIMES DAILY
Qty: 30 EACH | Refills: 3 | Status: SHIPPED | OUTPATIENT
Start: 2018-11-16 | End: 2018-11-16 | Stop reason: SDUPTHER

## 2018-11-16 RX ORDER — POTASSIUM CHLORIDE 1.5 G/1.77G
20 POWDER, FOR SOLUTION ORAL 2 TIMES DAILY
Qty: 180 EACH | Refills: 3 | Status: SHIPPED | OUTPATIENT
Start: 2018-11-16 | End: 2019-01-14 | Stop reason: SDUPTHER

## 2018-11-16 NOTE — TELEPHONE ENCOUNTER
I returned call to [facility] informed her that I will send in the 90 day refill she is requesting to [facility] pharmacy. Patient verbalized understanding of all information given.

## 2019-01-14 ENCOUNTER — TELEPHONE (OUTPATIENT)
Dept: CARDIOLOGY CLINIC | Age: 49
End: 2019-01-14

## 2019-01-14 DIAGNOSIS — I50.30 DIASTOLIC CONGESTIVE HEART FAILURE, UNSPECIFIED HF CHRONICITY (HCC): ICD-10-CM

## 2019-01-14 RX ORDER — POTASSIUM CHLORIDE 1.5 G/1.77G
20 POWDER, FOR SOLUTION ORAL 2 TIMES DAILY
Qty: 180 EACH | Refills: 0 | Status: SHIPPED | OUTPATIENT
Start: 2019-01-14 | End: 2020-11-18

## 2019-01-18 RX ORDER — POTASSIUM CHLORIDE 1.5 G/1.77G
20 POWDER, FOR SOLUTION ORAL 2 TIMES DAILY
Qty: 60 PACKET | Refills: 5 | Status: SHIPPED | OUTPATIENT
Start: 2019-01-18 | End: 2019-07-03 | Stop reason: SDUPTHER

## 2019-03-18 ENCOUNTER — HOSPITAL ENCOUNTER (EMERGENCY)
Age: 49
Discharge: HOME | End: 2019-03-18
Payer: COMMERCIAL

## 2019-03-18 ENCOUNTER — APPOINTMENT (OUTPATIENT)
Dept: ULTRASOUND IMAGING | Age: 49
End: 2019-03-18
Payer: COMMERCIAL

## 2019-03-18 VITALS
TEMPERATURE: 98.3 F | RESPIRATION RATE: 16 BRPM | DIASTOLIC BLOOD PRESSURE: 87 MMHG | OXYGEN SATURATION: 100 % | SYSTOLIC BLOOD PRESSURE: 138 MMHG | WEIGHT: 238 LBS | HEIGHT: 62 IN | BODY MASS INDEX: 43.79 KG/M2 | HEART RATE: 65 BPM

## 2019-03-18 DIAGNOSIS — M54.16 LUMBAR RADICULOPATHY: Primary | ICD-10-CM

## 2019-03-18 DIAGNOSIS — R42 DIZZINESS: ICD-10-CM

## 2019-03-18 DIAGNOSIS — D72.819 LEUKOPENIA, UNSPECIFIED TYPE: ICD-10-CM

## 2019-03-18 LAB
ALBUMIN SERPL-MCNC: 3.7 GM/DL (ref 3.4–5)
ALP BLD-CCNC: 118 IU/L (ref 40–129)
ALT SERPL-CCNC: 28 U/L (ref 10–40)
ANION GAP SERPL CALCULATED.3IONS-SCNC: 10 MMOL/L (ref 4–16)
AST SERPL-CCNC: 44 IU/L (ref 15–37)
BASOPHILS ABSOLUTE: 0.1 K/CU MM
BASOPHILS RELATIVE PERCENT: 1.6 % (ref 0–1)
BILIRUB SERPL-MCNC: 0.6 MG/DL (ref 0–1)
BUN BLDV-MCNC: 13 MG/DL (ref 6–23)
CALCIUM SERPL-MCNC: 8.3 MG/DL (ref 8.3–10.6)
CHLORIDE BLD-SCNC: 103 MMOL/L (ref 99–110)
CO2: 23 MMOL/L (ref 21–32)
CREAT SERPL-MCNC: 0.9 MG/DL (ref 0.6–1.1)
DIFFERENTIAL TYPE: ABNORMAL
EOSINOPHILS ABSOLUTE: 0.1 K/CU MM
EOSINOPHILS RELATIVE PERCENT: 4.1 % (ref 0–3)
GFR AFRICAN AMERICAN: >60 ML/MIN/1.73M2
GFR NON-AFRICAN AMERICAN: >60 ML/MIN/1.73M2
GLUCOSE BLD-MCNC: 95 MG/DL (ref 70–99)
HCT VFR BLD CALC: 39.1 % (ref 37–47)
HEMOGLOBIN: 12.4 GM/DL (ref 12.5–16)
IMMATURE NEUTROPHIL %: 0.3 % (ref 0–0.43)
LYMPHOCYTES ABSOLUTE: 0.6 K/CU MM
LYMPHOCYTES RELATIVE PERCENT: 17.5 % (ref 24–44)
MCH RBC QN AUTO: 28.1 PG (ref 27–31)
MCHC RBC AUTO-ENTMCNC: 31.7 % (ref 32–36)
MCV RBC AUTO: 88.7 FL (ref 78–100)
MONOCYTES ABSOLUTE: 0.3 K/CU MM
MONOCYTES RELATIVE PERCENT: 8.9 % (ref 0–4)
NUCLEATED RBC %: 0 %
PDW BLD-RTO: 14.9 % (ref 11.7–14.9)
PLATELET # BLD: 156 K/CU MM (ref 140–440)
PMV BLD AUTO: 10.7 FL (ref 7.5–11.1)
POTASSIUM SERPL-SCNC: 4 MMOL/L (ref 3.5–5.1)
RBC # BLD: 4.41 M/CU MM (ref 4.2–5.4)
SEGMENTED NEUTROPHILS ABSOLUTE COUNT: 2.1 K/CU MM
SEGMENTED NEUTROPHILS RELATIVE PERCENT: 67.6 % (ref 36–66)
SODIUM BLD-SCNC: 136 MMOL/L (ref 135–145)
TOTAL IMMATURE NEUTOROPHIL: 0.01 K/CU MM
TOTAL NUCLEATED RBC: 0 K/CU MM
TOTAL PROTEIN: 7 GM/DL (ref 6.4–8.2)
WBC # BLD: 3.1 K/CU MM (ref 4–10.5)

## 2019-03-18 PROCEDURE — 93971 EXTREMITY STUDY: CPT

## 2019-03-18 PROCEDURE — 6370000000 HC RX 637 (ALT 250 FOR IP): Performed by: PHYSICIAN ASSISTANT

## 2019-03-18 PROCEDURE — 80053 COMPREHEN METABOLIC PANEL: CPT

## 2019-03-18 PROCEDURE — 93005 ELECTROCARDIOGRAM TRACING: CPT | Performed by: EMERGENCY MEDICINE

## 2019-03-18 PROCEDURE — 96372 THER/PROPH/DIAG INJ SC/IM: CPT

## 2019-03-18 PROCEDURE — 99284 EMERGENCY DEPT VISIT MOD MDM: CPT

## 2019-03-18 PROCEDURE — 85025 COMPLETE CBC W/AUTO DIFF WBC: CPT

## 2019-03-18 PROCEDURE — 6360000002 HC RX W HCPCS: Performed by: PHYSICIAN ASSISTANT

## 2019-03-18 PROCEDURE — 93010 ELECTROCARDIOGRAM REPORT: CPT | Performed by: INTERNAL MEDICINE

## 2019-03-18 PROCEDURE — 36415 COLL VENOUS BLD VENIPUNCTURE: CPT

## 2019-03-18 RX ORDER — CYCLOBENZAPRINE HCL 10 MG
10 TABLET ORAL 3 TIMES DAILY PRN
Qty: 20 TABLET | Refills: 0 | Status: SHIPPED | OUTPATIENT
Start: 2019-03-18 | End: 2019-03-28

## 2019-03-18 RX ORDER — HYDROCODONE BITARTRATE AND ACETAMINOPHEN 5; 325 MG/1; MG/1
1 TABLET ORAL ONCE
Status: COMPLETED | OUTPATIENT
Start: 2019-03-18 | End: 2019-03-18

## 2019-03-18 RX ORDER — KETOROLAC TROMETHAMINE 30 MG/ML
30 INJECTION, SOLUTION INTRAMUSCULAR; INTRAVENOUS ONCE
Status: COMPLETED | OUTPATIENT
Start: 2019-03-18 | End: 2019-03-18

## 2019-03-18 RX ORDER — TRAMADOL HYDROCHLORIDE 50 MG/1
50 TABLET ORAL EVERY 6 HOURS PRN
Qty: 12 TABLET | Refills: 0 | Status: SHIPPED | OUTPATIENT
Start: 2019-03-18 | End: 2019-03-21

## 2019-03-18 RX ORDER — METHYLPREDNISOLONE 4 MG/1
TABLET ORAL
Qty: 1 KIT | Refills: 0 | Status: SHIPPED | OUTPATIENT
Start: 2019-03-18 | End: 2020-11-18

## 2019-03-18 RX ADMIN — KETOROLAC TROMETHAMINE 30 MG: 30 INJECTION, SOLUTION INTRAMUSCULAR; INTRAVENOUS at 10:46

## 2019-03-18 RX ADMIN — HYDROCODONE BITARTRATE AND ACETAMINOPHEN 1 TABLET: 5; 325 TABLET ORAL at 10:46

## 2019-03-18 ASSESSMENT — PAIN SCALES - GENERAL
PAINLEVEL_OUTOF10: 10
PAINLEVEL_OUTOF10: 10

## 2019-03-18 ASSESSMENT — PAIN DESCRIPTION - LOCATION
LOCATION: LEG
LOCATION: LEG

## 2019-03-18 ASSESSMENT — PAIN DESCRIPTION - ORIENTATION
ORIENTATION: LEFT
ORIENTATION: LEFT

## 2019-03-18 ASSESSMENT — PAIN DESCRIPTION - PAIN TYPE
TYPE: ACUTE PAIN
TYPE: ACUTE PAIN

## 2019-03-22 LAB
EKG ATRIAL RATE: 72 BPM
EKG DIAGNOSIS: NORMAL
EKG P AXIS: 24 DEGREES
EKG P-R INTERVAL: 174 MS
EKG Q-T INTERVAL: 414 MS
EKG QRS DURATION: 74 MS
EKG QTC CALCULATION (BAZETT): 453 MS
EKG R AXIS: -14 DEGREES
EKG T AXIS: 23 DEGREES
EKG VENTRICULAR RATE: 72 BPM

## 2019-04-23 DIAGNOSIS — I50.30 DIASTOLIC CONGESTIVE HEART FAILURE, UNSPECIFIED HF CHRONICITY (HCC): ICD-10-CM

## 2019-04-23 RX ORDER — POTASSIUM CHLORIDE 1.5 G/1.77G
20 POWDER, FOR SOLUTION ORAL 2 TIMES DAILY
Qty: 60 PACKET | Refills: 5 | OUTPATIENT
Start: 2019-04-23

## 2019-05-07 ENCOUNTER — TELEPHONE (OUTPATIENT)
Dept: CARDIOLOGY CLINIC | Age: 49
End: 2019-05-07

## 2019-05-08 ENCOUNTER — HOSPITAL ENCOUNTER (OUTPATIENT)
Age: 49
Discharge: HOME | End: 2019-05-08
Payer: COMMERCIAL

## 2019-05-08 ENCOUNTER — OFFICE VISIT (OUTPATIENT)
Dept: CARDIOLOGY CLINIC | Age: 49
End: 2019-05-08
Payer: COMMERCIAL

## 2019-05-08 VITALS
HEIGHT: 62 IN | SYSTOLIC BLOOD PRESSURE: 136 MMHG | HEART RATE: 80 BPM | WEIGHT: 245 LBS | BODY MASS INDEX: 45.08 KG/M2 | DIASTOLIC BLOOD PRESSURE: 74 MMHG

## 2019-05-08 DIAGNOSIS — R53.83 TIREDNESS: Primary | ICD-10-CM

## 2019-05-08 LAB
ANION GAP SERPL CALCULATED.3IONS-SCNC: 15 MMOL/L (ref 4–16)
BASOPHILS ABSOLUTE: 0.1 K/CU MM
BASOPHILS RELATIVE PERCENT: 1.5 % (ref 0–1)
BUN BLDV-MCNC: 17 MG/DL (ref 6–23)
CALCIUM SERPL-MCNC: 9 MG/DL (ref 8.3–10.6)
CHLORIDE BLD-SCNC: 102 MMOL/L (ref 99–110)
CO2: 23 MMOL/L (ref 21–32)
CREAT SERPL-MCNC: 1 MG/DL (ref 0.6–1.1)
DIFFERENTIAL TYPE: ABNORMAL
EOSINOPHILS ABSOLUTE: 0.4 K/CU MM
EOSINOPHILS RELATIVE PERCENT: 6.7 % (ref 0–3)
GFR AFRICAN AMERICAN: >60 ML/MIN/1.73M2
GFR NON-AFRICAN AMERICAN: 59 ML/MIN/1.73M2
GLUCOSE BLD-MCNC: 86 MG/DL (ref 70–99)
HCT VFR BLD CALC: 38.8 % (ref 37–47)
HEMOGLOBIN: 12.1 GM/DL (ref 12.5–16)
IMMATURE NEUTROPHIL %: 0.2 % (ref 0–0.43)
LYMPHOCYTES ABSOLUTE: 1.9 K/CU MM
LYMPHOCYTES RELATIVE PERCENT: 31.6 % (ref 24–44)
MCH RBC QN AUTO: 28.2 PG (ref 27–31)
MCHC RBC AUTO-ENTMCNC: 31.2 % (ref 32–36)
MCV RBC AUTO: 90.4 FL (ref 78–100)
MONOCYTES ABSOLUTE: 0.5 K/CU MM
MONOCYTES RELATIVE PERCENT: 8.9 % (ref 0–4)
NUCLEATED RBC %: 0 %
PDW BLD-RTO: 15 % (ref 11.7–14.9)
PLATELET # BLD: 260 K/CU MM (ref 140–440)
PMV BLD AUTO: 10.7 FL (ref 7.5–11.1)
POTASSIUM SERPL-SCNC: 4.4 MMOL/L (ref 3.5–5.1)
RBC # BLD: 4.29 M/CU MM (ref 4.2–5.4)
SEGMENTED NEUTROPHILS ABSOLUTE COUNT: 3.1 K/CU MM
SEGMENTED NEUTROPHILS RELATIVE PERCENT: 51.1 % (ref 36–66)
SODIUM BLD-SCNC: 140 MMOL/L (ref 135–145)
T4 FREE: 1.11 NG/DL (ref 0.9–1.8)
TOTAL IMMATURE NEUTOROPHIL: 0.01 K/CU MM
TOTAL NUCLEATED RBC: 0 K/CU MM
TSH HIGH SENSITIVITY: 2.83 UIU/ML (ref 0.27–4.2)
WBC # BLD: 6.1 K/CU MM (ref 4–10.5)

## 2019-05-08 PROCEDURE — G8427 DOCREV CUR MEDS BY ELIG CLIN: HCPCS | Performed by: INTERNAL MEDICINE

## 2019-05-08 PROCEDURE — 80048 BASIC METABOLIC PNL TOTAL CA: CPT

## 2019-05-08 PROCEDURE — G8417 CALC BMI ABV UP PARAM F/U: HCPCS | Performed by: INTERNAL MEDICINE

## 2019-05-08 PROCEDURE — 1036F TOBACCO NON-USER: CPT | Performed by: INTERNAL MEDICINE

## 2019-05-08 PROCEDURE — 99214 OFFICE O/P EST MOD 30 MIN: CPT | Performed by: INTERNAL MEDICINE

## 2019-05-08 PROCEDURE — 85025 COMPLETE CBC W/AUTO DIFF WBC: CPT

## 2019-05-08 PROCEDURE — 84439 ASSAY OF FREE THYROXINE: CPT

## 2019-05-08 PROCEDURE — 84443 ASSAY THYROID STIM HORMONE: CPT

## 2019-05-08 PROCEDURE — 36415 COLL VENOUS BLD VENIPUNCTURE: CPT

## 2019-05-08 NOTE — TELEPHONE ENCOUNTER
Pt returned my call stating having chest pressure, sob, and bilat leg edema. Scheduled ov w/Dr. Danika Stanley today.  Pt verbalized understanding

## 2019-05-08 NOTE — PROGRESS NOTES
Fluticasone Propionate (FLONASE NA) by Nasal route      cetirizine (ZYRTEC) 10 MG tablet Take 10 mg by mouth daily.  sertraline (ZOLOFT) 50 MG tablet Take 50 mg by mouth daily.  omeprazole (PRILOSEC) 40 MG capsule Take 40 mg by mouth daily. No current facility-administered medications for this visit. Allergies: Tape [adhesive tape] and Ibuprofen  Past Medical History:   Diagnosis Date    Anemia     Anesthesia     Trouble waking up.  Arthritis     right knee-S/p Orthovisc injections X3    Blood pressure elevated without history of HTN 11/14/2013    per [email or web address] visit- 124/90, 124/92    Blood transfusion Last 6/2011    No reaction    Cardiomegaly 10/13/2012    per CT chest-in Epic    Chronic sinusitis     Depression     Environmental allergies     Family history of early CAD     Sister- CABG- age 39; Father- Massive MI- age 43    H/O cardiovascular stress test 6/10/05     EF 69% Normal study.  H/O echocardiogram 11/13;5/05 11/13 EF55% mild-mod MR,TR, mod pulm htn 2005/ EF 52%,Mild MR and TR    H/O echocardiogram 08/02/2018    EF 60-43%  Grade 1 diastolic dysfunction, sclerotic, but non-stenotic aortic valve, Mild MR, Mild-Mod TR, Mild-Mod PHTN, no pericardial effusion    H/O gastric bypass     H/O hypoglycemia     History of blood transfusion     History of cardiac cath 2005    No signif.CAD.  History of cardiac cath 08/30/2018    normal coronaries    History of nuclear stress test 08/10/2018    HX OTHER MEDICAL 12/30/13    EVENT MONITOR: 12/13-Sinus rhythm, sinus bradycardia. 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     Prolonged emergence from general anesthesia     Seasonal allergies      Past Surgical History:   Procedure Laterality Date · Gastrointestinal: No abdominal pain, appetite loss, blood in stools, constipation, diarrhea or heartburn  · Genitourinary: No dysuria, trouble voiding, or hematuria  · Musculoskeletal:  No gait disturbance, weakness or joint complaints  · Integumentary: No rash or pruritis  · Neurological: No TIA or stroke symptoms  · Psychiatric: No anxiety or depression  · Endocrine: No malaise, fatigue or temperature intolerance  · Hematologic/Lymphatic: No bleeding problems, blood clots or swollen lymph nodes  · Allergic/Immunologic: No nasal congestion or hives  All systems negative except as marked. Objective:  /74   Pulse 80   Ht 5' 2\" (1.575 m)   Wt 245 lb (111.1 kg)   LMP 06/04/2012   BMI 44.81 kg/m²   Wt Readings from Last 3 Encounters:   05/08/19 245 lb (111.1 kg)   03/18/19 238 lb (108 kg)   11/09/18 247 lb (112 kg)     Body mass index is 44.81 kg/m². GENERAL - Alert, oriented, pleasant, in no apparent distress,normal grooming  HEENT - pupils are reactive to light and accomodation, cornea intact, conjunctive normal color, ears are normal in exam,throat exam in normal, teeth, gum and palate are normal, oral mucosa is normal without any notation of pallor or cyanosis  Neck - Supple. No jugular venous distention noted. No carotid bruits, no apical -carotid delay  Respiratory:  Normal breath sounds, No respiratory distress, No wheezing, No chest tenderness. ,no use of accessory muscles, diaphragm movement is normal  Cardiovascular: (PMI) apex non displaced,no lifts no thrills, no s3,no s4, Normal heart rate, Normal rhythm, No murmurs, No rubs, No gallops.  Carotid arteries pulse and amplitude are normal no bruit, no abdominal bruit noted ( normal abdominal aorta ausculation), femoral arteries pulse and amplitude are normal no bruit, pedal pulses are normal  Femoral pulses have normal amplitude, no bruits   Extremities - No cyanosis, clubbing, or significant edema, no varicose veins    Abdomen - No masses, resolved  3. Stress and anxiety: lot of stress and anxiety, she has to pay rent, car is broke  4. Diastolic CHF and pulmonary HTN: continue lasix and low dose KCL. 5. Obesity: s.p gastric bypass'  6. Migraine: stable, off medications  7.  Depression:stable, continue zoloft

## 2019-05-10 LAB
T4 TOTAL: 6.31 UG/DL (ref 5.1–14.1)
T4 TOTAL: NORMAL UG/DL (ref 5.1–14.1)

## 2019-05-14 ENCOUNTER — TELEPHONE (OUTPATIENT)
Dept: CARDIOLOGY CLINIC | Age: 49
End: 2019-05-14

## 2019-07-03 ENCOUNTER — TELEPHONE (OUTPATIENT)
Dept: CARDIOLOGY CLINIC | Age: 49
End: 2019-07-03

## 2019-07-03 DIAGNOSIS — I50.30 DIASTOLIC CONGESTIVE HEART FAILURE, UNSPECIFIED HF CHRONICITY (HCC): ICD-10-CM

## 2019-07-03 RX ORDER — POTASSIUM CHLORIDE 1.5 G/1.77G
20 POWDER, FOR SOLUTION ORAL 2 TIMES DAILY
Qty: 60 PACKET | Refills: 5 | Status: SHIPPED | OUTPATIENT
Start: 2019-07-03 | End: 2021-12-22 | Stop reason: SDUPTHER

## 2019-07-03 RX ORDER — FUROSEMIDE 20 MG/1
20 TABLET ORAL 2 TIMES DAILY
Qty: 180 TABLET | Refills: 3 | Status: SHIPPED | OUTPATIENT
Start: 2019-07-03 | End: 2019-11-11 | Stop reason: SDUPTHER

## 2019-07-03 NOTE — TELEPHONE ENCOUNTER
Patient accidentally through her potasium and furosemide when she cleaned out her car . She didn't know if [facility] will refill . Will she be ok to go a month with out ?

## 2019-07-17 ENCOUNTER — TELEPHONE (OUTPATIENT)
Dept: CARDIOLOGY CLINIC | Age: 49
End: 2019-07-17

## 2019-07-18 ENCOUNTER — OFFICE VISIT (OUTPATIENT)
Dept: CARDIOLOGY CLINIC | Age: 49
End: 2019-07-18
Payer: COMMERCIAL

## 2019-07-18 VITALS
SYSTOLIC BLOOD PRESSURE: 110 MMHG | WEIGHT: 238.6 LBS | HEIGHT: 62 IN | HEART RATE: 63 BPM | BODY MASS INDEX: 43.91 KG/M2 | DIASTOLIC BLOOD PRESSURE: 80 MMHG

## 2019-07-18 DIAGNOSIS — R60.9 EDEMA, UNSPECIFIED TYPE: ICD-10-CM

## 2019-07-18 DIAGNOSIS — R07.9 CHEST PAIN, UNSPECIFIED TYPE: ICD-10-CM

## 2019-07-18 DIAGNOSIS — I51.7 CARDIOMEGALY: Primary | ICD-10-CM

## 2019-07-18 PROCEDURE — 93000 ELECTROCARDIOGRAM COMPLETE: CPT | Performed by: NURSE PRACTITIONER

## 2019-07-18 PROCEDURE — 99213 OFFICE O/P EST LOW 20 MIN: CPT | Performed by: NURSE PRACTITIONER

## 2019-07-18 NOTE — PROGRESS NOTES
No blood clot - prior to gastric bypass for precautions.  Prolonged emergence from general anesthesia     Seasonal allergies      Past Surgical History:   Procedure Laterality Date     SECTION  1996    x 1    CHOLECYSTECTOMY  1996    Lap Choley    COLONOSCOPY      DIAGNOSTIC CARDIAC CATH LAB PROCEDURE  8/15/05    2005, No significant CAD    ENDOSCOPY, COLON, DIAGNOSTIC      ENDOSCOPY, COLON, DIAGNOSTIC  10/18/2018    Hiatal hernia    GASTRIC BYPASS SURGERY  2006    pre-wt 341#lb    HYSTERECTOMY  8/15/14    LAVH with Right S and O    JOINT REPLACEMENT Right 2016    KNEE ARTHROSCOPY Right 14    medial/lateral menisectomy, chondroplasty    KNEE SURGERY      right - centered knee cap and scrapes bone spurs and arthritis.  WI ESOPHAGOGASTRODUODENOSCOPY TRANSORAL DIAGNOSTIC N/A 10/18/2018    EGD DIAGNOSTIC ONLY / POSSIBLE BALLOON performed by Federico Cox MD at [address]       Left - fractured x 2     Family History   Problem Relation Age of Onset    Heart Disease Mother     Diabetes Mother     Cancer Mother         lung    Heart Disease Father         Massive MI    Early Death Father 43        Massive MI    Other Sister         pulmonary HTN, O2 ATC    Diabetes Sister     Heart Disease Sister 39        CABG at age 43    Cancer Brother 43        stomach - remission    Heart Disease Brother         MI, CABG at age 45   [de-identified] High Cholesterol Brother     High Blood Pressure Brother     Pacemaker Brother         defibulator    Diabetes Daughter     High Blood Pressure Daughter     Other Daughter         Bladder reflux     Social History     Tobacco Use    Smoking status: Never Smoker    Smokeless tobacco: Never Used    Tobacco comment: Exposed to second hand smoke only.    Substance Use Topics    Alcohol use: Yes     Comment: social        Review of Systems - History obtained from the patient  General: negative for - fatigue, malaise, night extremities   Neurologic - alert and oriented: There are no gross focal neurologic abnormalities. Skin-  No rash: No echymosis   Affect- normal mood and pleasant       DATA:  Lab Results   Component Value Date    TROPONINI 0.108 03/13/2014    TROPONINI 0.008 06/27/2011     BNP:  No results found for: BNP  PT/INR:  No results found for: PTINR  No results found for: LABA1C  Lab Results   Component Value Date    CHOL 216 11/08/2013    TRIG 95 11/08/2013    HDL 63 11/08/2013    LDLCALC 134 11/08/2013    LDLDIRECT 120 (H) 04/23/2012     Lab Results   Component Value Date    ALT 28 03/18/2019    AST 44 (H) 03/18/2019     TSH:  No results found for: TSH     Echo:  Left ventricular systolic function is normal with an ejection fraction of   55-60%.  Grade I diastolic dysfunction.   Sclerotic, but non-stenotic aortic valve.   Doppler evaluation reveals mild mitral and mild to moderate tricuspid   regurgitation.   Right ventricular systolic pressure of 48 mmHg consistent with mild to   moderate pulmonary hypertension.   No evidence of pericardial effusion. The ASCVD Risk score ([address] et al., 2013) failed to calculate for the following reasons:    Cannot find a previous HDL lab    Cannot find a previous total cholesterol lab    Assessment/ Plan:     Cardiomegaly  Water intake monitoring no more than 64 oz a day. Continue current medications    Chest pain  Stress related angina-EKG normal  Stress Test and Echo - August of 2018      Edema  Compression stockings ordered  *patient instructed to take two pills of 20 mg lasix tonight and in am then back to regular dose of one tab BID. If no improvement in lower extremity edema notify office. Appears hypervolemic   Cont with current medications  Monitor weight daily  Limit sodium to < 2000 mg a day  Limit water to < 64 oz in a day  Call the office if a weight gain of >3 lbs in 2 days or > 5 lbs in a week is noted.   Last testing: Echo 8/20/18  EF 55-60%       Patient

## 2019-07-19 ENCOUNTER — TELEPHONE (OUTPATIENT)
Dept: CARDIOLOGY CLINIC | Age: 49
End: 2019-07-19

## 2019-07-19 NOTE — TELEPHONE ENCOUNTER
Patient seen by Kurtis Ness NP yesterday, she is reporting back that leg swelling is about the same and she has gained 2.5 lb

## 2019-09-23 ENCOUNTER — OFFICE VISIT (OUTPATIENT)
Dept: CARDIOLOGY CLINIC | Age: 49
End: 2019-09-23
Payer: COMMERCIAL

## 2019-09-23 VITALS
HEART RATE: 88 BPM | HEIGHT: 62 IN | BODY MASS INDEX: 41.92 KG/M2 | WEIGHT: 227.8 LBS | DIASTOLIC BLOOD PRESSURE: 70 MMHG | SYSTOLIC BLOOD PRESSURE: 112 MMHG

## 2019-09-23 DIAGNOSIS — I10 ESSENTIAL HYPERTENSION: Primary | ICD-10-CM

## 2019-09-23 PROCEDURE — G8427 DOCREV CUR MEDS BY ELIG CLIN: HCPCS | Performed by: INTERNAL MEDICINE

## 2019-09-23 PROCEDURE — G8417 CALC BMI ABV UP PARAM F/U: HCPCS | Performed by: INTERNAL MEDICINE

## 2019-09-23 PROCEDURE — 1036F TOBACCO NON-USER: CPT | Performed by: INTERNAL MEDICINE

## 2019-09-23 PROCEDURE — 99214 OFFICE O/P EST MOD 30 MIN: CPT | Performed by: INTERNAL MEDICINE

## 2019-09-23 NOTE — PROGRESS NOTES
to pay rent, car is broke  4. Diastolic CHF and pulmonary HTN: continue lasix and low dose KCL. 5. Obesity: s.p gastric bypass'lost > 25 lbs recently  6. Migraine: stable, off medications  7. Depression:stable, continue zoloftAll labs, medications and tests reviewed, continue all other medications of all above medical condition listed as is.     @RAMÓN@

## 2019-11-11 DIAGNOSIS — I50.30 DIASTOLIC CONGESTIVE HEART FAILURE, UNSPECIFIED HF CHRONICITY (HCC): ICD-10-CM

## 2019-11-11 RX ORDER — FUROSEMIDE 20 MG/1
20 TABLET ORAL 2 TIMES DAILY
Qty: 180 TABLET | Refills: 3 | Status: SHIPPED | OUTPATIENT
Start: 2019-11-11 | End: 2020-10-08 | Stop reason: SDUPTHER

## 2019-11-11 RX ORDER — POTASSIUM CHLORIDE 1.5 G/1.77G
20 POWDER, FOR SOLUTION ORAL 2 TIMES DAILY
Qty: 180 EACH | Refills: 3 | Status: SHIPPED | OUTPATIENT
Start: 2019-11-11 | End: 2020-10-08

## 2019-11-11 RX ORDER — POTASSIUM CHLORIDE 1.5 G/1.77G
20 POWDER, FOR SOLUTION ORAL 2 TIMES DAILY
Qty: 60 PACKET | Refills: 5 | Status: CANCELLED | OUTPATIENT
Start: 2019-11-11

## 2020-03-12 ENCOUNTER — TELEPHONE (OUTPATIENT)
Dept: CARDIOLOGY CLINIC | Age: 50
End: 2020-03-12

## 2020-03-12 NOTE — LETTER
[facility]  [address], [facility] [address], [address]  Phone: [phone]    Fax [phone]                  Laurent Ponce MD, Al Joy MD, Rodríguez Malin MD, MD Von Stuart MD Jenene Slough, MD Harlon Balm, BUSHRAHIMA Corriganifton Denis, BUSHRAHIMA Lofton, BUSHRABUSHRA AndersonHIMA    Cardiac Risk Assessment   3/18/2020        Surgery Date: Pending  Surgery: Maxillary & ethmoid sinus surgery/Septoplasty  Anesthesia Type: General  Fax Number: [phone]    To: Dr. Duke Mohr  From : BARBARA Flores    Patient Name: Antonietta Sylvester     YOB: 1970  MRN: [id number]    Antonietta Sylvester was evaluated from a cardiac standpoint for her surgery or procedure and based on her history, diagnosis, recent cardiac testing, she is considered a low risk candidate for any rené-operative cardiac complications. Antiplatelet/anticoagulant therapy can be held prior to the surgery or procedure at the discretion of the surgeon to be resumed as soon as possible if held. Please call with any further questions.     Respectfully,     Darrell TOMLINSONCNP  EN/cjs    (This cardiac clearance is good for 6 months from 3/18/20 unless new cardiac symptoms arise.)

## 2020-03-18 ENCOUNTER — TELEPHONE (OUTPATIENT)
Dept: CARDIOLOGY CLINIC | Age: 50
End: 2020-03-18

## 2020-07-06 ENCOUNTER — APPOINTMENT (OUTPATIENT)
Dept: CT IMAGING | Age: 50
End: 2020-07-06
Payer: COMMERCIAL

## 2020-07-06 ENCOUNTER — HOSPITAL ENCOUNTER (EMERGENCY)
Age: 50
Discharge: HOME | End: 2020-07-06
Attending: EMERGENCY MEDICINE
Payer: COMMERCIAL

## 2020-07-06 VITALS
DIASTOLIC BLOOD PRESSURE: 92 MMHG | WEIGHT: 230 LBS | HEART RATE: 77 BPM | SYSTOLIC BLOOD PRESSURE: 144 MMHG | TEMPERATURE: 97.8 F | OXYGEN SATURATION: 97 % | RESPIRATION RATE: 16 BRPM | BODY MASS INDEX: 42.33 KG/M2 | HEIGHT: 62 IN

## 2020-07-06 PROCEDURE — 99284 EMERGENCY DEPT VISIT MOD MDM: CPT

## 2020-07-06 PROCEDURE — 70486 CT MAXILLOFACIAL W/O DYE: CPT

## 2020-07-06 ASSESSMENT — PAIN DESCRIPTION - PAIN TYPE: TYPE: ACUTE PAIN

## 2020-07-06 ASSESSMENT — PAIN DESCRIPTION - DESCRIPTORS: DESCRIPTORS: CONSTANT

## 2020-07-06 ASSESSMENT — PAIN SCALES - GENERAL: PAINLEVEL_OUTOF10: 10

## 2020-07-06 ASSESSMENT — PAIN DESCRIPTION - FREQUENCY: FREQUENCY: CONTINUOUS

## 2020-07-06 ASSESSMENT — PAIN DESCRIPTION - LOCATION: LOCATION: FACE

## 2020-07-07 ENCOUNTER — CARE COORDINATION (OUTPATIENT)
Dept: CARE COORDINATION | Age: 50
End: 2020-07-07

## 2020-07-08 ENCOUNTER — CARE COORDINATION (OUTPATIENT)
Dept: CARE COORDINATION | Age: 50
End: 2020-07-08

## 2020-07-08 NOTE — CARE COORDINATION
Call to pt for ed f/u, no answer, Second attempt. No further outreach planned. Rcvd call back from pt. Patient contacted regarding recent discharge and COVID-19 risk. Discussed COVID-19 related testing which was not done at this time. Test results were not done. Patient informed of results, if available? No     Care Transition Nurse/ Ambulatory Care Manager contacted the patient by telephone to perform post discharge assessment. Verified name and  with patient as identifiers. Patient has following risk factors of: heart failure. CTN/ACM reviewed discharge instructions, medical action plan and red flags related to discharge diagnosis. Reviewed and educated them on any new and changed medications related to discharge diagnosis. Advised obtaining a 90-day supply of all daily and as-needed medications. Education provided regarding infection prevention, and signs and symptoms of COVID-19 and when to seek medical attention with patient who verbalized understanding. Discussed exposure protocols and quarantine from [address] you at higher risk for severe illness  and given an opportunity for questions and concerns. The patient agrees to contact the COVID-19 hotline [phone] or PCP office for questions related to their healthcare. CTN/ACM provided contact information for future reference. From CDC: Are you at higher risk for severe illness?  Wash your hands often.  Avoid close contact (6 feet, which is about two arm lengths) with people who are sick.  Put distance between yourself and other people if COVID-19 is spreading in your community.  Clean and disinfect frequently touched surfaces.  Avoid all cruise travel and non-essential air travel.  Call your healthcare professional if you have concerns about COVID-19 and your underlying condition or if you are sick.     For more information on steps you can take to protect yourself, see CDC's How to [address] [place] [address] for follow-up call in 7-14 days based on severity of symptoms and risk factors. Denies any symptoms, has called pcp office and scheduled f/u.

## 2020-07-09 NOTE — ED PROVIDER NOTES
Emergency Department Encounter    Patient: Bry Farrar  MRN: [id number]  : 1970  Date of Evaluation: 2020  ED Provider:  Geo Clark    Triage Chief Complaint:   Fall and Facial Injury    [place]:  Bry Farrar is a 48 y.o. female that presents patient tripped over a rock over the weekend and fell to the ground, face first, hitting her nose, she states she is here to make sure her nose is not broken. She has some bruising to her nose and face, with some mild achy pain in that region, has no headache, no visual changes, no extremity numbness tingling or weakness. She has no other injuries or complaints. No neck back chest or abdominal pain. She has been treating symptomatically with mild improvement. ROS - see HPI, below listed is current ROS at time of my eval:  General:  no weakness  Eyes:  No recent vison changes  ENT:  No sore throat, no nasal congestion, no hearing changes  Cardiovascular:  No chest pain  Respiratory:  No shortness of breath  Gastrointestinal:  No pain, no nausea, no vomiting  Musculoskeletal:  No muscle pain, no joint pain  Skin:  No rash, no pruritis, no easy bruising  Neurologic:  No speech problems, + facial pain, no extremity numbness, no extremity tingling, no extremity weakness, no neck pain or stiffness  Extremities:  no edema, no pain    Past Medical History:   Diagnosis Date    Anemia     Anesthesia     Trouble waking up.  Arthritis     right knee-S/p Orthovisc injections X3    Blood pressure elevated without history of HTN 2013    per [address] visit- 124/90, 124/92    Blood transfusion Last 2011    No reaction    Cardiomegaly 10/13/2012    per CT chest-in Epic    Chronic sinusitis     Depression     Environmental allergies     Family history of early CAD     Sister- CABG- age 39; Father- Massive MI- age 43    H/O cardiovascular stress test 6/10/05     EF 69% Normal study.     H/O echocardiogram ; EF55% Father 43        Massive MI    Other Sister         pulmonary HTN, O2 ATC    Diabetes Sister     Heart Disease Sister 39        CABG at age 43    Cancer Brother 43        stomach - remission    Heart Disease Brother         MI, CABG at age 45   [place] High Cholesterol Brother     High Blood Pressure Brother     Pacemaker Brother         defibulator    Diabetes Daughter     High Blood Pressure Daughter     Other Daughter         Bladder reflux     Social History     Socioeconomic History    Marital status:      Spouse name: Not on file    Number of children: 3    Years of education: Not on file    Highest education level: Not on file   Occupational History    Not on file   Social Needs    Financial resource strain: Not on file    Food insecurity     Worry: Not on file     Inability: Not on file   [employer] needs     Medical: Not on file     Non-medical: Not on file   Tobacco Use    Smoking status: Never Smoker    Smokeless tobacco: Never Used    Tobacco comment: Exposed to second hand smoke only. Substance and Sexual Activity    Alcohol use: Yes     Comment: social    Drug use: No    Sexual activity: Yes   Lifestyle    Physical activity     Days per week: Not on file     Minutes per session: Not on file    Stress: Not on file   Relationships    Social connections     Talks on phone: Not on file     Gets together: Not on file     Attends Christianity service: Not on file     Active member of club or organization: Not on file     Attends meetings of clubs or organizations: Not on file     Relationship status: Not on file    Intimate partner violence     Fear of current or ex partner: Not on file     Emotionally abused: Not on file     Physically abused: Not on file     Forced sexual activity: Not on file   Other Topics Concern    Not on file   Social History Narrative    Patient is Right-handed. Primary language is [place].     Patient lives with 3 daughters 11/2013     No current facility-administered medications for this encounter. Current Outpatient Medications   Medication Sig Dispense Refill    furosemide (LASIX) 20 MG tablet Take 1 tablet by mouth 2 times daily 180 tablet 3    Elastic Bandages & Supports (JOBST KNEE HIGH COMPRESSION SM) MISC 1 each by Does not apply route daily 1 each 2    potassium chloride (KLOR-CON) 20 MEQ packet Take 20 mEq by mouth 2 times daily Patient will need to make and keep and office visit for more refills. 180 each 0    Multiple Vitamins-Minerals (MULTI COMPLETE PO) Take by mouth      Fluticasone Propionate (FLONASE NA) by Nasal route      cetirizine (ZYRTEC) 10 MG tablet Take 10 mg by mouth daily.  sertraline (ZOLOFT) 50 MG tablet Take 50 mg by mouth daily.  omeprazole (PRILOSEC) 40 MG capsule Take 40 mg by mouth daily.  potassium chloride (KLOR-CON) 20 MEQ packet Take 20 mEq by mouth 2 times daily 180 each 3    potassium chloride (KLOR-CON) 20 MEQ packet Take 20 mEq by mouth 2 times daily 60 packet 5    methylPREDNISolone (MEDROL, CONCEPCION,) 4 MG tablet Take by mouth. 1 kit 0    famotidine (PEPCID) 20 MG tablet Take 1 tablet by mouth 2 times daily as needed (heart burn) 60 tablet 0    aluminum & magnesium hydroxide-simethicone (MAALOX REGULAR STRENGTH) 200-200-20 MG/5ML SUSP suspension Take 10 mLs by mouth every 8 hours as needed for Indigestion 1 Bottle 0     Allergies   Allergen Reactions    Tape [Adhesive Tape]     Ibuprofen Nausea And Vomiting     Can tolerate toradol (s/p gastric bypass)       Nursing Notes Reviewed    Physical Exam:  Triage VS:    ED Triage Vitals [07/06/20 0940]   Enc Vitals Group      BP (!) 144/92      Pulse 77      Resp 16      Temp 97.8 °F (36.6 °C)      Temp src       SpO2 97 %      Weight 230 lb (104.3 kg)      Height 5' 2\" (1.575 m)      Head Circumference       Peak Flow       Pain Score       Pain Loc       Pain Edu? Excl. in [address]?         My pulse ox interpretation is - normal    General appearance:  No acute distress. Head: Normocephalic, atraumatic  Face: No bony deformity, she does have some nasal bridge swelling and ecchymosis, she has some ecchymosis to the lower lateral nasal region, just below her eyes  Skin:  Warm. Dry. No acute wounds  Eye:  Extraocular movements intact. Ears, nose, mouth and throat:  Oral mucosa moist   Neck:  Trachea midline. Extremity:  Normal ROM. Back: No midline CTLS tenderness to palpation or step-offs  Heart:  Regular  Perfusion:  intact  Respiratory:  Respirations nonlabored. Abdominal: Non distended. Neurological:  Alert and oriented times 3.  5 out of 5 motor strength and sensation intact to light touch in all extremities, CN grossly intact, gait normal, no drift    I have reviewed and interpreted all of the currently available lab results from this visit (if applicable):  No results found for this visit on 07/06/20. Radiographs (if obtained):  Radiologist's Report Reviewed:  Ct Maxillofacial Wo Contrast    Result Date: 7/6/2020  EXAMINATION: CT OF THE FACE WITHOUT CONTRAST  7/6/2020 9:56 am TECHNIQUE: CT of the face was performed without the administration of intravenous contrast. Multiplanar reformatted images are provided for review. Dose modulation, iterative reconstruction, and/or weight based adjustment of the mA/kV was utilized to reduce the radiation dose to as low as reasonably achievable. COMPARISON: None. HISTORY: ORDERING SYSTEM PROVIDED HISTORY: trauma TECHNOLOGIST PROVIDED HISTORY: Reason for exam:->trauma Is the patient pregnant?->No FINDINGS: FACIAL BONES:  The maxilla, pterygoid plates and zygomatic arches are intact. The mandible is intact. The mandibular condyles are normally situated. The nasal bones and maxillary nasal processes are intact. ORBITS:  The globes appear intact. The extraocular muscles, optic nerve sheath complexes and lacrimal glands appear unremarkable. No retrobulbar hematoma or mass is seen.   The orbital walls and rims are intact. SINUSES/MASTOIDS:  There is mild mucosal thickening within the paranasal sinuses diffusely. Bilateral maxillary sinus antrostomies are identified. Mild nasal septal deviation to the right is noted. SOFT TISSUES:  No appreciable facial soft tissue swelling is seen. No acute traumatic injury of the facial bones. Bilateral maxillary sinus antrostomies. Mild mucosal thickening within the paranasal sinuses. MDM:  Patient presenting after head/facial injury. Presentation, history and physical exam do not appear consistent with intracranial hemorrhage. Patient did not have LOC, amnesia or disorientation after head trauma. Patient is <61years old  No new focal neuro deficits  No AMS  No signs of skull fracture (no hemotympanum, no racoon's eyes, no evidence of CSF otorrhea or rhinorrhea, no mastoid bruising, no skull depression or step off)  No clinical intoxication  Did not haveVomiting  No Amnesia  did not fall from >3 feet or down more than 5 stairs. I completed a structured, evidence-based clinical evaluation to screen for intracranial injury in this patient. The evidence indicates that the patient is very low risk for intracranial injury requiring surgical intervention, and this is consistent with my clinical intuition. I did order a CT of her maxillofacial bones, read by radiology as no traumatic injury. The risk of further imaging or hospitalization is likely higher than the risk of the patient having an intracranial injury requiring surgical intervention. It is, therefore, in the patients best interest not to do additional emergent testing or be hospitalized. This was discussed with the patient and they understand and agree. At this point I do believe we can discharge patient, they need to follow-up with their primary care physician and/or neurologist, they understand and agree with the plan, return warnings given. Clinical Impression:  1. Contusion of face, initial encounter      Disposition referral (if applicable):   Brisa Hodgson APRN - CNP  [address] [facility]  [phone]    In 1 week      Disposition medications (if applicable):  Discharge Medication List as of 7/6/2020 10:48 AM        ED Provider Disposition Time  DISPOSITION Decision To Discharge 07/06/2020 10:32:15 AM      Comment: (Please note that portions of this note may have been completed with a voice recognition program. Efforts were made to edit the dictations but occasionally words are mis-transcribed.)       Bradford Leong MD  07/09/20 1652

## 2020-07-22 ENCOUNTER — CARE COORDINATION (OUTPATIENT)
Dept: CARE COORDINATION | Age: 50
End: 2020-07-22

## 2020-07-22 NOTE — CARE COORDINATION
Call to pt for 2wk f/u call. No answer. No further outreach scheduled with this CTN/ACM. Episode of Care resolved. Patient has this CTN/ACM contact information if future needs arise.

## 2020-07-29 RX ORDER — FUROSEMIDE 20 MG/1
TABLET ORAL
Qty: 60 TABLET | Refills: 4 | OUTPATIENT
Start: 2020-07-29

## 2020-10-01 DIAGNOSIS — I50.30 DIASTOLIC CONGESTIVE HEART FAILURE, UNSPECIFIED HF CHRONICITY (HCC): ICD-10-CM

## 2020-10-01 RX ORDER — FUROSEMIDE 20 MG/1
20 TABLET ORAL 2 TIMES DAILY
Qty: 180 TABLET | Refills: 3 | OUTPATIENT
Start: 2020-10-01

## 2020-10-08 ENCOUNTER — OFFICE VISIT (OUTPATIENT)
Dept: CARDIOLOGY CLINIC | Age: 50
End: 2020-10-08
Payer: COMMERCIAL

## 2020-10-08 VITALS
WEIGHT: 249.6 LBS | BODY MASS INDEX: 45.93 KG/M2 | HEART RATE: 63 BPM | DIASTOLIC BLOOD PRESSURE: 82 MMHG | HEIGHT: 62 IN | SYSTOLIC BLOOD PRESSURE: 126 MMHG

## 2020-10-08 PROCEDURE — 3017F COLORECTAL CA SCREEN DOC REV: CPT | Performed by: INTERNAL MEDICINE

## 2020-10-08 PROCEDURE — 1036F TOBACCO NON-USER: CPT | Performed by: INTERNAL MEDICINE

## 2020-10-08 PROCEDURE — 99214 OFFICE O/P EST MOD 30 MIN: CPT | Performed by: INTERNAL MEDICINE

## 2020-10-08 PROCEDURE — G8427 DOCREV CUR MEDS BY ELIG CLIN: HCPCS | Performed by: INTERNAL MEDICINE

## 2020-10-08 PROCEDURE — 93000 ELECTROCARDIOGRAM COMPLETE: CPT | Performed by: INTERNAL MEDICINE

## 2020-10-08 PROCEDURE — G8484 FLU IMMUNIZE NO ADMIN: HCPCS | Performed by: INTERNAL MEDICINE

## 2020-10-08 PROCEDURE — G8417 CALC BMI ABV UP PARAM F/U: HCPCS | Performed by: INTERNAL MEDICINE

## 2020-10-08 RX ORDER — POTASSIUM CHLORIDE 20 MEQ/1
20 TABLET, EXTENDED RELEASE ORAL DAILY
Qty: 180 TABLET | Refills: 1 | Status: SHIPPED | OUTPATIENT
Start: 2020-10-08 | End: 2020-11-18

## 2020-10-08 RX ORDER — FUROSEMIDE 20 MG/1
20 TABLET ORAL 2 TIMES DAILY
Qty: 180 TABLET | Refills: 3 | Status: SHIPPED | OUTPATIENT
Start: 2020-10-08 | End: 2021-12-22 | Stop reason: SDUPTHER

## 2020-10-08 RX ORDER — FUROSEMIDE 20 MG/1
20 TABLET ORAL 2 TIMES DAILY
Qty: 180 TABLET | Refills: 3 | Status: CANCELLED | OUTPATIENT
Start: 2020-10-08

## 2020-10-08 NOTE — PROGRESS NOTES
Gabriela Yeboah MD        OFFICE  FOLLOWUP NOTE    Chief complaints: patient is here for management of diastolic CHF, pulmonary HTN, obesity,migraine,depression    Subjective: + shortness of breath, no dizziness, no palpitations, + NIGHT SWEAT    HPI Ruslan Sparks is a 48 y. o.year old who  has a past medical history of Anemia, Anesthesia, Arthritis, Blood pressure elevated without history of HTN, Blood transfusion, Cardiomegaly, Chronic sinusitis, Depression, Environmental allergies, Family history of early CAD, H/O cardiovascular stress test, H/O echocardiogram, H/O echocardiogram, H/O gastric bypass, H/O hypoglycemia, History of blood transfusion, History of cardiac cath, History of cardiac cath, History of nuclear stress test, HX OTHER MEDICAL, Insomnia, Migraine headache, Obesity, Other bone involvement in diseases classified elsewhere, Presence of IVC filter, Prolonged emergence from general anesthesia, and Seasonal allergies. and presents for management of diastolic CHF, pulmonary HTN, obesity,migraine,depression which are well controlled    She did not refill her lasix and has gained 20 lbs,     Current Outpatient Medications   Medication Sig Dispense Refill    furosemide (LASIX) 20 MG tablet Take 1 tablet by mouth 2 times daily 180 tablet 3    potassium chloride (KLOR-CON) 20 MEQ packet Take 20 mEq by mouth 2 times daily 180 each 3    Elastic Bandages & Supports (JOBST KNEE HIGH COMPRESSION SM) MISC 1 each by Does not apply route daily 1 each 2    potassium chloride (KLOR-CON) 20 MEQ packet Take 20 mEq by mouth 2 times daily 60 packet 5    methylPREDNISolone (MEDROL, CONCEPCION,) 4 MG tablet Take by mouth. 1 kit 0    potassium chloride (KLOR-CON) 20 MEQ packet Take 20 mEq by mouth 2 times daily Patient will need to make and keep and office visit for more refills.  180 each 0    famotidine (PEPCID) 20 MG tablet Take 1 tablet by mouth 2 times daily as needed (heart burn) 60 tablet 0    aluminum & (rare disorder)    Presence of IVC filter     No blood clot - prior to gastric bypass for precautions.  Prolonged emergence from general anesthesia     Seasonal allergies      Past Surgical History:   Procedure Laterality Date     SECTION  1996    x 1    CHOLECYSTECTOMY  1996    Lap Choley    COLONOSCOPY      DIAGNOSTIC CARDIAC CATH LAB PROCEDURE  8/15/05    2005, No significant CAD    ENDOSCOPY, COLON, DIAGNOSTIC      ENDOSCOPY, COLON, DIAGNOSTIC  10/18/2018    Hiatal hernia    GASTRIC BYPASS SURGERY  2006    pre-wt 341#lb    HYSTERECTOMY  8/15/14    LAVH with Right S and O    JOINT REPLACEMENT Right 2016    KNEE ARTHROSCOPY Right 14    medial/lateral menisectomy, chondroplasty    KNEE SURGERY      right - centered knee cap and scrapes bone spurs and arthritis.  NASAL SEPTUM SURGERY      AL ESOPHAGOGASTRODUODENOSCOPY TRANSORAL DIAGNOSTIC N/A 10/18/2018    EGD DIAGNOSTIC ONLY / POSSIBLE BALLOON [facility]      Left - fractured x 2     Family History   Problem Relation Age of Onset    Heart Disease Mother     Diabetes Mother     Cancer Mother         lung    Heart Disease Father         Massive MI    Early Death Father 43        Massive MI    Other Sister         pulmonary HTN, O2 ATC    Diabetes Sister     Heart Disease Sister 39        CABG at age 43    Cancer Brother 43        stomach - remission    Heart Disease Brother         MI, CABG at age 45   [facility] High Cholesterol Brother     High Blood Pressure Brother     Pacemaker Brother         defibulator    Diabetes Daughter     High Blood Pressure Daughter     Other Daughter         Bladder reflux     Social History     Tobacco Use    Smoking status: Never Smoker    Smokeless tobacco: Never Used    Tobacco comment: Exposed to second hand smoke only.    Substance Use Topics    Alcohol use: Yes     Comment: social      @EMA@  Review of Systems: · Constitutional: No Fever or Weight Loss   · Eyes: No Decreased Vision  · ENT: + Headaches, Hearing Loss or Vertigo  · Cardiovascular: No chest pain, dyspnea on exertion, palpitations or loss of consciousness  · Respiratory: No cough or wheezing    · Gastrointestinal: No abdominal pain, + nausea, appetite loss, blood in stools, constipation, diarrhea or heartburn  · Genitourinary: No dysuria, trouble voiding, or hematuria  · Musculoskeletal:  No gait disturbance, weakness or joint complaints  · Integumentary: No rash or pruritis  · Neurological: No TIA or stroke symptoms  · Psychiatric: No anxiety or depression  · Endocrine: No malaise, fatigue or temperature intolerance  · Hematologic/Lymphatic: No bleeding problems, blood clots or swollen lymph nodes  · Allergic/Immunologic: No nasal congestion or hives  All systems negative except as marked. Objective:  /82   Pulse 63   Ht 5' 2\" (1.575 m)   Wt 249 lb 9.6 oz (113.2 kg)   LMP 06/04/2012   BMI 45.65 kg/m²   Wt Readings from Last 3 Encounters:   10/08/20 249 lb 9.6 oz (113.2 kg)   07/06/20 230 lb (104.3 kg)   09/23/19 227 lb 12.8 oz (103.3 kg)     Body mass index is 45.65 kg/m². GENERAL - Alert, oriented, pleasant, in no apparent distress,normal grooming  HEENT - pupils are reactive to light and accomodation, cornea intact, conjunctive normal color, ears are normal in exam,throat exam in normal, teeth, gum and palate are normal, oral mucosa is normal without any notation of pallor or cyanosis  Neck - Supple. No jugular venous distention noted. No carotid bruits, no apical -carotid delay  Respiratory:  Normal breath sounds, No respiratory distress, No wheezing, No chest tenderness. ,no use of accessory muscles, diaphragm movement is normal  Cardiovascular: (PMI) apex non displaced,no lifts no thrills, no s3,no s4, Normal heart rate, Normal rhythm, No murmurs, No rubs, No gallops.  Carotid arteries pulse and amplitude are normal no bruit, no abdominal bruit noted ( normal abdominal aorta ausculation), femoral arteries pulse and amplitude are normal no bruit, pedal pulses are normal  Femoral pulses have normal amplitude, no bruits   Extremities - No cyanosis, clubbing, or significant edema, no varicose veins    Abdomen - No masses, tenderness, or organomegaly, no hepato-splenomegally, no bruits  Musculoskeletal  +edema, no kyphosis or scoliosis, no deformity in any extremity noted, muscle strength and tone are normal  Skin: no ulcer,no scar,no stasis dermatitis   Neurologic - alert oriented times 3,Cranial nerves II through XII are grossly intact. There were no gross focal neurologic abnormalities. All sensory and motor nerves examined and were normal  Psychiatric: normal mood and affect    Lab Results   Component Value Date    TROPONINI 0.108 03/13/2014    TROPONINI 0.008 06/27/2011     BNP:  No results found for: BNP  PT/INR:  No results found for: PTINR  No results found for: LABA1C  Lab Results   Component Value Date    CHOL 216 11/08/2013    TRIG 95 11/08/2013    HDL 63 11/08/2013    LDLCALC 134 11/08/2013    LDLDIRECT 120 (H) 04/23/2012     Lab Results   Component Value Date    ALT 28 03/18/2019    AST 44 (H) 03/18/2019     TSH:  No results found for: TSH    Impression:  Conchita De Oliveira is a 48 y. o.year old who  has a past medical history of Anemia, Anesthesia, Arthritis, Blood pressure elevated without history of HTN, Blood transfusion, Cardiomegaly, Chronic sinusitis, Depression, Environmental allergies, Family history of early CAD, H/O cardiovascular stress test, H/O echocardiogram, H/O echocardiogram, H/O gastric bypass, H/O hypoglycemia, History of blood transfusion, History of cardiac cath, History of cardiac cath, History of nuclear stress test, HX OTHER MEDICAL, Insomnia, Migraine headache, Obesity, Other bone involvement in diseases classified elsewhere, Presence of IVC filter, Prolonged emergence from general anesthesia, and Seasonal allergies.  and presents with     Plan:  1. Tiredness: better off gabapentin  2. Shingles: resolved last yr  3. Stress and anxiety: better, she is remarried, happy now  4. Diastolic CHF and pulmonary HTN: refill  lasix and refill low dose KCL. 5. NIGHT SWEATS: recommend to get holter monitor, she has night mare also, she is not interested for now. 6. Leg swelling: from diastolic CHF  7. Obesity: s.p gastric bypass'lost > 25 lbs recently  8. Migraine: stable, off medications  9. Depression:stable, continue [employer] maintenance: exerise and diet  10. All labs, medications and tests reviewed, continue all other medications of all above medical condition listed as is.

## 2020-10-08 NOTE — LETTER
Angel Smith  1970  [id number]    Have you had any Chest Pain - No    Have you had any Shortness of Breath - Yes  If Yes - When on exertion    Have you had any dizziness - No      Have you had any palpitations - No    Do you have any edema - swelling in legs    If Yes - CHECK TO SEE IF THE EDEMA IS PITTING  How long have they been having edema - Weeks  If Yes - Have they worn compression stockings No      Do you have a surgery or procedure scheduled in the near future - No

## 2020-11-18 ENCOUNTER — HOSPITAL ENCOUNTER (OUTPATIENT)
Dept: INFUSION THERAPY | Age: 50
Discharge: HOME | End: 2020-11-18
Payer: COMMERCIAL

## 2020-11-18 ENCOUNTER — INITIAL CONSULT (OUTPATIENT)
Dept: ONCOLOGY | Age: 50
End: 2020-11-18
Payer: COMMERCIAL

## 2020-11-18 VITALS
HEIGHT: 62 IN | WEIGHT: 242.2 LBS | RESPIRATION RATE: 16 BRPM | DIASTOLIC BLOOD PRESSURE: 90 MMHG | OXYGEN SATURATION: 96 % | HEART RATE: 94 BPM | SYSTOLIC BLOOD PRESSURE: 134 MMHG | TEMPERATURE: 96.9 F | BODY MASS INDEX: 44.57 KG/M2

## 2020-11-18 DIAGNOSIS — D50.9 MICROCYTIC HYPOCHROMIC ANEMIA: ICD-10-CM

## 2020-11-18 LAB
ALBUMIN SERPL-MCNC: 4.1 GM/DL (ref 3.4–5)
ALP BLD-CCNC: 152 IU/L (ref 40–129)
ALT SERPL-CCNC: 35 U/L (ref 10–40)
ANION GAP SERPL CALCULATED.3IONS-SCNC: 15 MMOL/L (ref 4–16)
APTT: 27.1 SECONDS (ref 25.1–37.1)
AST SERPL-CCNC: 68 IU/L (ref 15–37)
BASOPHILS ABSOLUTE: 0.1 K/CU MM
BASOPHILS RELATIVE PERCENT: 1.3 % (ref 0–1)
BILIRUB SERPL-MCNC: 0.5 MG/DL (ref 0–1)
BUN BLDV-MCNC: 9 MG/DL (ref 6–23)
CALCIUM SERPL-MCNC: 8.9 MG/DL (ref 8.3–10.6)
CHLORIDE BLD-SCNC: 97 MMOL/L (ref 99–110)
CO2: 25 MMOL/L (ref 21–32)
CREAT SERPL-MCNC: 0.7 MG/DL (ref 0.6–1.1)
DIFFERENTIAL TYPE: ABNORMAL
EOSINOPHILS ABSOLUTE: 0.2 K/CU MM
EOSINOPHILS RELATIVE PERCENT: 3.9 % (ref 0–3)
FERRITIN: 19 NG/ML (ref 15–150)
FOLATE: 19.6 NG/ML (ref 3.1–17.5)
GFR AFRICAN AMERICAN: >60 ML/MIN/1.73M2
GFR NON-AFRICAN AMERICAN: >60 ML/MIN/1.73M2
GLUCOSE BLD-MCNC: 83 MG/DL (ref 70–99)
HCT VFR BLD CALC: 34.9 % (ref 37–47)
HEMOGLOBIN: 11.1 GM/DL (ref 12.5–16)
INR BLD: 0.9 INDEX
IRON: 31 UG/DL (ref 37–145)
LYMPHOCYTES ABSOLUTE: 1 K/CU MM
LYMPHOCYTES RELATIVE PERCENT: 19.8 % (ref 24–44)
MCH RBC QN AUTO: 23.4 PG (ref 27–31)
MCHC RBC AUTO-ENTMCNC: 31.8 % (ref 32–36)
MCV RBC AUTO: 73.5 FL (ref 78–100)
MONOCYTES ABSOLUTE: 0.3 K/CU MM
MONOCYTES RELATIVE PERCENT: 6.6 % (ref 0–4)
PCT TRANSFERRIN: 8 % (ref 10–44)
PDW BLD-RTO: 22.3 % (ref 11.7–14.9)
PLATELET # BLD: 208 K/CU MM (ref 140–440)
PMV BLD AUTO: 9.1 FL (ref 7.5–11.1)
POTASSIUM SERPL-SCNC: 3.9 MMOL/L (ref 3.5–5.1)
PROTHROMBIN TIME: 10.9 SECONDS (ref 11.7–14.5)
RBC # BLD: 4.75 M/CU MM (ref 4.2–5.4)
SEGMENTED NEUTROPHILS ABSOLUTE COUNT: 3.6 K/CU MM
SEGMENTED NEUTROPHILS RELATIVE PERCENT: 68.4 % (ref 36–66)
SODIUM BLD-SCNC: 137 MMOL/L (ref 135–145)
TOTAL IRON BINDING CAPACITY: 411 UG/DL (ref 250–450)
TOTAL PROTEIN: 7.4 GM/DL (ref 6.4–8.2)
UNSATURATED IRON BINDING CAPACITY: 380 UG/DL (ref 110–370)
VITAMIN B-12: 180.6 PG/ML (ref 211–911)
VITAMIN D 25-HYDROXY: 24.75 NG/ML
WBC # BLD: 5.2 K/CU MM (ref 4–10.5)

## 2020-11-18 PROCEDURE — G8482 FLU IMMUNIZE ORDER/ADMIN: HCPCS | Performed by: INTERNAL MEDICINE

## 2020-11-18 PROCEDURE — 85730 THROMBOPLASTIN TIME PARTIAL: CPT

## 2020-11-18 PROCEDURE — G8417 CALC BMI ABV UP PARAM F/U: HCPCS | Performed by: INTERNAL MEDICINE

## 2020-11-18 PROCEDURE — 1036F TOBACCO NON-USER: CPT | Performed by: INTERNAL MEDICINE

## 2020-11-18 PROCEDURE — 84590 ASSAY OF VITAMIN A: CPT

## 2020-11-18 PROCEDURE — G8427 DOCREV CUR MEDS BY ELIG CLIN: HCPCS | Performed by: INTERNAL MEDICINE

## 2020-11-18 PROCEDURE — 36415 COLL VENOUS BLD VENIPUNCTURE: CPT

## 2020-11-18 PROCEDURE — 82607 VITAMIN B-12: CPT

## 2020-11-18 PROCEDURE — 82306 VITAMIN D 25 HYDROXY: CPT

## 2020-11-18 PROCEDURE — 84597 ASSAY OF VITAMIN K: CPT

## 2020-11-18 PROCEDURE — 85025 COMPLETE CBC W/AUTO DIFF WBC: CPT

## 2020-11-18 PROCEDURE — 80053 COMPREHEN METABOLIC PANEL: CPT

## 2020-11-18 PROCEDURE — 83550 IRON BINDING TEST: CPT

## 2020-11-18 PROCEDURE — 99201 HC NEW PT, OUTPT VISIT LEVEL 1: CPT

## 2020-11-18 PROCEDURE — 82728 ASSAY OF FERRITIN: CPT

## 2020-11-18 PROCEDURE — 82746 ASSAY OF FOLIC ACID SERUM: CPT

## 2020-11-18 PROCEDURE — 3017F COLORECTAL CA SCREEN DOC REV: CPT | Performed by: INTERNAL MEDICINE

## 2020-11-18 PROCEDURE — 99204 OFFICE O/P NEW MOD 45 MIN: CPT | Performed by: INTERNAL MEDICINE

## 2020-11-18 PROCEDURE — 85610 PROTHROMBIN TIME: CPT

## 2020-11-18 PROCEDURE — 83540 ASSAY OF IRON: CPT

## 2020-11-18 ASSESSMENT — PATIENT HEALTH QUESTIONNAIRE - PHQ9
SUM OF ALL RESPONSES TO PHQ9 QUESTIONS 1 & 2: 0
SUM OF ALL RESPONSES TO PHQ QUESTIONS 1-9: 0
1. LITTLE INTEREST OR PLEASURE IN DOING THINGS: 0
2. FEELING DOWN, DEPRESSED OR HOPELESS: 0

## 2020-11-18 NOTE — PROGRESS NOTES
MA Rooming Questions  Patient: Haley Payne  MRN: [id number]    Date: 11/18/2020        New patient      5. Did the patient have a depression screening completed today?  Yes    PHQ-9 Total Score: 0 (11/18/2020  2:09 PM)       PHQ-9 Given to (if applicable):               PHQ-9 Score (if applicable):                     [] Positive     []  Negative              Does question #9 need addressed (if applicable)                     [] Yes    []  No               Americo Lesches, MA

## 2020-11-18 NOTE — PROGRESS NOTES
Patient Name:  Fan Katz  Patient :  1970  Patient MRN:  [id number]     Primary Oncologist: Fouzia Cameron MD  Referring Provider: BUSHRA Constantino CNP     Date of Service: 2020      Reason for Consult: To evaluate the patient with microcytic hypochromic anemia. Chief Complaint:    Chief Complaint   Patient presents with    New Patient     Patient Active Problem List:     Microcytic hypochromic anemia     Morbid obesity-status post gastric bypass surgery    HPI:   Natalie ROLDANDomingo Child is a 51-year-old very pleasant female with medical history significant for hyperlipidemia, family history of early coronary artery disease, osteoarthritis, pulmonary hypertension, morbid obesity, status post gastric bypass surgery in , migraine headache and history of blood transfusion, referred to me on 2020 for evaluation of her microcytic hypochromic anemia. She stated that she was seen by aJlyn Cho on 20 for right upper quadrant abdominal pain. Since Ms. Issac Child has mild anemia (Hb 12.1) on 2019 blood test, she requested repeat CBC. It was done on 2020 and she is found to have microcytic hypochromic anemia (Hb 9.4, MCV 75.8, MCH 22.4). Since she is found to have microcytic hypochromic anemia, she is referred to me for further evaluation. She had colonoscopy on 2020 in [place]. As per patient, she was found to have one polyp and it was removed during the procedure. She stated that she is taking multivitamins 1 tablet daily since 2018. She complains of tiredness, fatigue, and ice craving lately. Besides that, she does not have any other significant symptoms at today visit. Past Medical History:     Significant for  1. Hyperlipidemia  2. Family history of early coronary artery disease  3. Osteoarthritis  4. Pulmonary hypertension     5. Morbid obesity status post gastric bypass surgery  6. Migraine headache  7.   History of blood transfusion (required 8 units of blood transfusion in 2011 due to chronic bleeding per vagina, she required ablation followed by hysterectomy)  8. Depression/anxiety    Past Surgery History:    Significant for  1.  section in   2. Laparoscopic cholecystectomy in   3. Gastric bypass surgery in   4. Vaginal hysterectomy with right salpingo-oophorectomy in August 15, 2014  5. Right knee joint replacement in   6. Nasal septum surgery  7. Left wrist fracture surgery in                                                                           Social History:   She denies smoking, alcohol drinking or illicit drug abuse. She is currently living in [facility]. Family History:    Significant for coronary artery disease and one of her sister [required CABG at the age of 44] and one of her brother [required CABG at the age of 45]. Her father dying of massive myocardial infarction at the age of 43. One of her brother has stomach cancer and her mother and maternal grandmother has lung cancer. Maternal grandfather and maternal aunt have bone cancer. Allergies   Allergen Reactions    Tape Sanjeev Shanna Tape]     Ibuprofen Nausea And Vomiting     Can tolerate toradol (s/p gastric bypass)       Current Outpatient Medications on File Prior to Visit   Medication Sig Dispense Refill    furosemide (LASIX) 20 MG tablet Take 1 tablet by mouth 2 times daily 180 tablet 3    potassium chloride (KLOR-CON) 20 MEQ packet Take 20 mEq by mouth 2 times daily 60 packet 5    Multiple Vitamins-Minerals (MULTI COMPLETE PO) Take by mouth      cetirizine (ZYRTEC) 10 MG tablet Take 10 mg by mouth daily.  sertraline (ZOLOFT) 50 MG tablet Take 50 mg by mouth daily.  omeprazole (PRILOSEC) 40 MG capsule Take 40 mg by mouth daily. No current facility-administered medications on file prior to visit. Review of Systems:  Constitutional:  No weight loss, No fever, No chills, No night sweats. Energy level is low. Eyes:  No diplopia, No transient or permanent loss of vision, No scotomata. ENT / Mouth:  No epistaxis, No dysphagia, No hoarseness, No oral ulcers, No gingival bleeding. No sore throat, No postnasal drip, No nasal drip, No mouth pain, No sinus pain, No tinnitus, Normal hearing. Cardiovascular:  No chest pain, No palpitations, No syncope, No upper extremity edema, No lower extremity edema, No calf discomfort. Respiratory:  No cough. No hemoptysis, No pleurisy, No wheezing, No dyspnea. Breast:  No breast mass, No pain, No nipple discharge, No change in size, No change in shape. Gastrointestinal:  No abdominal pain, No abdominal cramping, No nausea, No vomiting, No constipation, No diarrhea, No hematochezia, No melena, No jaundice, No dyspepsia, No dysphagia. Urinary:  No dysuria, No hematuria, No urinary incontinence. Gynecological:  No vaginal discharge, No suprapubic pain, No abnormal vaginal bleeding. Musculoskeletal:  No muscle pain, No swollen joints, No joint redness, No bone pain, No spine tenderness. Skin:  No rash, No nodules, No pruritus, No lesions. Neurologic:  No confusion, No seizures, No syncope, No tremor, No speech change, No headache, No hiccups, No abnormal gait, No sensory changes, No weakness. Psychiatric:  No depression, No anxiety, Concentration normal.  Endocrine:  No polyuria, No polydipsia, No hot flashes, No thyroid symptoms. Hematologic:  No epistaxis, No gingival bleeding, No petechiae, No ecchymosis. Lymphatic:  No lymphadenopathy, No lymphedema. Allergy / Immunologic:  No eczema, No frequent mucous infections, No frequent respiratory infections, No recurrent urticarial, No frequent skin infections.      Vital Signs: BP (!) 134/90 (Site: Right Upper Arm, Position: Sitting, Cuff Size: Large Adult)   Pulse 94   Temp 96.9 °F (36.1 °C) (Infrared)   Resp 16   Ht 5' 2\" (1.575 m)   Wt 242 lb 3.2 oz (109.9 kg)   LMP 06/04/2012   SpO2 96%   BMI 44.30 kg/m²      Physical Exam:  CONSTITUTIONAL: awake, no apparent distress, alert, cooperative,    EYES: pupils equal, round and reactive to light, sclera clear and conjunctiva normal  ENT: Normocephalic, without obvious abnormality, atraumatic  NECK: supple, symmetrical, no jugular venous distension and no carotid bruits   HEMATOLOGIC/LYMPHATIC: no cervical, supraclavicular or axillary lymphadenopathy   LUNGS: VBS, no wheezes, no crackles, no rhonchi, no increased work of breathing and clear to auscultation   CARDIOVASCULAR: regular rate and rhythm, normal S1 and S2, no murmur noted  ABDOMEN: soft, non-distended, normal bowel sounds x 4, non-tender, no masses palpated, no hepatosplenomegaly   MUSCULOSKELETAL: full range of motion noted, tone is normal  NEUROLOGIC: awake, alert, oriented to name, place and time. Motor skills grossly intact. SKIN: Normal skin color, texture, turgor and no jaundice.  appears intact   EXTREMITIES: no LE edema, no leg swelling, no cyanosis, no clubbing      Labs:  Hematology:  Lab Results   Component Value Date    WBC 6.1 05/08/2019    RBC 4.29 05/08/2019    HGB 12.1 (L) 05/08/2019    HCT 38.8 05/08/2019    MCV 90.4 05/08/2019    MCH 28.2 05/08/2019    MCHC 31.2 (L) 05/08/2019    RDW 15.0 (H) 05/08/2019     05/08/2019    MPV 10.7 05/08/2019    BANDSPCT 8 03/17/2014    SEGSPCT 51.1 05/08/2019    EOSRELPCT 6.7 (H) 05/08/2019    BASOPCT 1.5 (H) 05/08/2019    LYMPHOPCT 31.6 05/08/2019    MONOPCT 8.9 (H) 05/08/2019    BANDABS 1.33 03/17/2014    SEGSABS 3.1 05/08/2019    EOSABS 0.4 05/08/2019    BASOSABS 0.1 05/08/2019    LYMPHSABS 1.9 05/08/2019    MONOSABS 0.5 05/08/2019    DIFFTYPE AUTOMATED DIFFERENTIAL 05/08/2019    WBCMORP RARE 03/17/2014    PLTM PLATELETS APPEAR NORMAL 03/13/2014     Lab Results   Component Value Date    ESR 16 08/20/2011     Chemistry:  Lab Results   Component Value Date     05/08/2019    K 4.4 05/08/2019     05/08/2019    CO2 23 05/08/2019    BUN 17 05/08/2019    CREATININE 1.0 05/08/2019    GLUCOSE 86 05/08/2019    CALCIUM 9.0 05/08/2019    PROT 7.0 03/18/2019    LABALBU 3.7 03/18/2019    BILITOT 0.6 03/18/2019    ALKPHOS 118 03/18/2019    AST 44 (H) 03/18/2019    ALT 28 03/18/2019    LABGLOM 59 (L) 05/08/2019    GFRAA >60 05/08/2019    MG 1.7 06/29/2011    POCGLU 88 03/18/2014     No results found for: MMA, LDH, HOMOCYSTEINE  No components found for: LD  Lab Results   Component Value Date    TSHHS 2.830 05/08/2019    T4FREE 1.11 05/08/2019     Immunology:  Lab Results   Component Value Date    PROT 7.0 03/18/2019     No results found for: Dedra Turner, KLFLCR  No results found for: B2M  Coagulation Panel:  Lab Results   Component Value Date    PROTIME 10.4 08/28/2018    INR 0.91 08/28/2018    APTT 26.1 08/28/2018    DDIMER 992 (H) 10/13/2012     Anemia Panel:  Lab Results   Component Value Date    PTKCKUXI70 225 08/20/2011    FOLATE >24.0 08/20/2011     Tumor Markers:  No results found for: , CEA, , LABCA2, PSA     Observations:  PHQ-9 Total Score: 0 (11/18/2020  2:09 PM)     Assessment   Microcytic hypochromic anemia  Morbid obesity-status post gastric bypass surgery    Plan:                                                                                                                   Becky Lyons is a 40-year-old very pleasant female who was seen by Gabbi Steele on 11/16/20 for right upper quadrant abdominal pain. Since Ms. Yrn Lyons has mild anemia (Hb 12.1) on 5/8/2019 blood test, she requested repeat CBC. It was done on 11/6/2020 and she is found to have microcytic hypochromic anemia (Hb 9.4, MCV 75.8, MCH 22.4). She had colonoscopy on November 13, 2020 in [place]. As per patient, she was found to have one polyp and it was removed during the procedure. I believe she probably has iron deficiency anemia and it is most likely due to malabsorption secondary to gastric bypass surgery. I recommend her to have proper anemia work-up today and I will follow-up with the results. If she is found to have iron deficiency anemia, I will give iron infusion as soon as possible. Oral iron may not be effective because of gastric bypass. I will also check vitamin D, B12, vitamin K and vitamin A level today. If she has deficiency in any of those vitamins, I will start supplementation as soon as possible. I answered all her questions and concerns for today. I asked her to follow up with primary care physician, Jackie Ritter on regular basis. I will continue to keep you updated on her progress. Thank you for allowing me to participate in the care of this very pleasant patient. Recent imaging and labs were reviewed and discussed with the patient.

## 2020-11-19 ENCOUNTER — TELEPHONE (OUTPATIENT)
Dept: ONCOLOGY | Age: 50
End: 2020-11-19

## 2020-11-19 PROBLEM — D50.0 IRON DEFICIENCY ANEMIA SECONDARY TO BLOOD LOSS (CHRONIC): Status: ACTIVE | Noted: 2020-11-19

## 2020-11-19 PROBLEM — K90.9 INTESTINAL MALABSORPTION: Status: ACTIVE | Noted: 2020-11-19

## 2020-11-19 RX ORDER — EPINEPHRINE 1 MG/ML
0.3 INJECTION, SOLUTION, CONCENTRATE INTRAVENOUS PRN
Status: CANCELLED | OUTPATIENT
Start: 2020-11-19

## 2020-11-19 RX ORDER — SODIUM CHLORIDE 9 MG/ML
INJECTION, SOLUTION INTRAVENOUS CONTINUOUS
Status: CANCELLED | OUTPATIENT
Start: 2020-11-19

## 2020-11-19 RX ORDER — DIPHENHYDRAMINE HYDROCHLORIDE 50 MG/ML
50 INJECTION INTRAMUSCULAR; INTRAVENOUS ONCE
Status: CANCELLED | OUTPATIENT
Start: 2020-11-19

## 2020-11-19 RX ORDER — SODIUM CHLORIDE 0.9 % (FLUSH) 0.9 %
10 SYRINGE (ML) INJECTION PRN
Status: CANCELLED | OUTPATIENT
Start: 2020-11-19

## 2020-11-19 RX ORDER — SODIUM CHLORIDE 0.9 % (FLUSH) 0.9 %
5 SYRINGE (ML) INJECTION PRN
Status: CANCELLED | OUTPATIENT
Start: 2020-11-19

## 2020-11-19 RX ORDER — HEPARIN SODIUM (PORCINE) LOCK FLUSH IV SOLN 100 UNIT/ML 100 UNIT/ML
500 SOLUTION INTRAVENOUS PRN
Status: CANCELLED | OUTPATIENT
Start: 2020-11-19

## 2020-11-19 RX ORDER — METHYLPREDNISOLONE SODIUM SUCCINATE 125 MG/2ML
125 INJECTION, POWDER, LYOPHILIZED, FOR SOLUTION INTRAMUSCULAR; INTRAVENOUS ONCE
Status: CANCELLED | OUTPATIENT
Start: 2020-11-19

## 2020-11-19 NOTE — TELEPHONE ENCOUNTER
NN reviewed results of labs with pt. Explained that DR Denver Dial wants pt to have an iron infusion. Pt expressed understanding. Order placed.

## 2020-11-21 LAB
RETINYL PALMITATE: 0.06 MG/L (ref 0–0.1)
VITAMIN A LEVEL: 0.74 MG/L (ref 0.3–1.2)
VITAMIN A, INTERP: NORMAL
VITAMIN K: 1.48 NMOL/L (ref 0.22–4.88)

## 2020-11-27 ENCOUNTER — TELEPHONE (OUTPATIENT)
Dept: INFUSION THERAPY | Age: 50
End: 2020-11-27

## 2020-11-30 ENCOUNTER — OFFICE VISIT (OUTPATIENT)
Dept: ONCOLOGY | Age: 50
End: 2020-11-30
Payer: COMMERCIAL

## 2020-11-30 ENCOUNTER — HOSPITAL ENCOUNTER (OUTPATIENT)
Dept: INFUSION THERAPY | Age: 50
Setting detail: INFUSION SERIES
Discharge: HOME | End: 2020-11-30
Payer: COMMERCIAL

## 2020-11-30 VITALS
OXYGEN SATURATION: 96 % | SYSTOLIC BLOOD PRESSURE: 139 MMHG | HEART RATE: 88 BPM | TEMPERATURE: 97.2 F | DIASTOLIC BLOOD PRESSURE: 95 MMHG | RESPIRATION RATE: 16 BRPM

## 2020-11-30 DIAGNOSIS — D50.9 IRON DEFICIENCY ANEMIA, UNSPECIFIED IRON DEFICIENCY ANEMIA TYPE: Primary | ICD-10-CM

## 2020-11-30 DIAGNOSIS — K90.9 INTESTINAL MALABSORPTION, UNSPECIFIED TYPE: ICD-10-CM

## 2020-11-30 PROCEDURE — 99442 PR PHYS/QHP TELEPHONE EVALUATION 11-20 MIN: CPT | Performed by: INTERNAL MEDICINE

## 2020-11-30 PROCEDURE — 96365 THER/PROPH/DIAG IV INF INIT: CPT

## 2020-11-30 PROCEDURE — 99211 OFF/OP EST MAY X REQ PHY/QHP: CPT

## 2020-11-30 PROCEDURE — 2580000003 HC RX 258: Performed by: INTERNAL MEDICINE

## 2020-11-30 PROCEDURE — 6360000002 HC RX W HCPCS: Performed by: INTERNAL MEDICINE

## 2020-11-30 RX ORDER — SODIUM CHLORIDE 0.9 % (FLUSH) 0.9 %
10 SYRINGE (ML) INJECTION PRN
Status: DISCONTINUED | OUTPATIENT
Start: 2020-11-30 | End: 2020-12-01 | Stop reason: HOSPADM

## 2020-11-30 RX ORDER — SODIUM CHLORIDE 9 MG/ML
INJECTION, SOLUTION INTRAVENOUS CONTINUOUS
Status: DISCONTINUED | OUTPATIENT
Start: 2020-11-30 | End: 2020-12-01 | Stop reason: HOSPADM

## 2020-11-30 RX ORDER — HEPARIN SODIUM (PORCINE) LOCK FLUSH IV SOLN 100 UNIT/ML 100 UNIT/ML
500 SOLUTION INTRAVENOUS PRN
Status: CANCELLED | OUTPATIENT
Start: 2020-12-07

## 2020-11-30 RX ORDER — SODIUM CHLORIDE 9 MG/ML
INJECTION, SOLUTION INTRAVENOUS CONTINUOUS
Status: CANCELLED | OUTPATIENT
Start: 2020-12-07

## 2020-11-30 RX ORDER — SODIUM CHLORIDE 0.9 % (FLUSH) 0.9 %
10 SYRINGE (ML) INJECTION PRN
Status: CANCELLED | OUTPATIENT
Start: 2020-12-07

## 2020-11-30 RX ORDER — METHYLPREDNISOLONE SODIUM SUCCINATE 125 MG/2ML
125 INJECTION, POWDER, LYOPHILIZED, FOR SOLUTION INTRAMUSCULAR; INTRAVENOUS ONCE
Status: CANCELLED | OUTPATIENT
Start: 2020-12-07

## 2020-11-30 RX ORDER — SODIUM CHLORIDE 0.9 % (FLUSH) 0.9 %
5 SYRINGE (ML) INJECTION PRN
Status: CANCELLED | OUTPATIENT
Start: 2020-12-07

## 2020-11-30 RX ORDER — DIPHENHYDRAMINE HYDROCHLORIDE 50 MG/ML
50 INJECTION INTRAMUSCULAR; INTRAVENOUS ONCE
Status: CANCELLED | OUTPATIENT
Start: 2020-12-07

## 2020-11-30 RX ORDER — EPINEPHRINE 1 MG/ML
0.3 INJECTION, SOLUTION, CONCENTRATE INTRAVENOUS PRN
Status: CANCELLED | OUTPATIENT
Start: 2020-12-07

## 2020-11-30 RX ADMIN — SODIUM CHLORIDE, PRESERVATIVE FREE 10 ML: 5 INJECTION INTRAVENOUS at 15:30

## 2020-11-30 RX ADMIN — FERRIC CARBOXYMALTOSE INJECTION 750 MG: 50 INJECTION, SOLUTION INTRAVENOUS at 15:32

## 2020-11-30 RX ADMIN — SODIUM CHLORIDE, PRESERVATIVE FREE 10 ML: 5 INJECTION INTRAVENOUS at 16:00

## 2020-11-30 ASSESSMENT — PATIENT HEALTH QUESTIONNAIRE - PHQ9
SUM OF ALL RESPONSES TO PHQ QUESTIONS 1-9: 0
SUM OF ALL RESPONSES TO PHQ9 QUESTIONS 1 & 2: 0
SUM OF ALL RESPONSES TO PHQ QUESTIONS 1-9: 0
1. LITTLE INTEREST OR PLEASURE IN DOING THINGS: 0
SUM OF ALL RESPONSES TO PHQ QUESTIONS 1-9: 0
2. FEELING DOWN, DEPRESSED OR HOPELESS: 0

## 2020-11-30 NOTE — PROGRESS NOTES
20 and she will receive her second dose of Injectafer infusion on 2020. I believe her iron deficiency is due to malabsorption from gastric bypass surgery. She is also found to have vitamin B12 deficiency even if she is taking multivitamin daily. I recommend her to start vitamin B12 1000 mcg daily and I sent a prescription for her today. Otherwise, I will reevaluate her again in 4 months and I will repeat all the blood tests a week before her next appointment. She complains of tiredness, fatigue, and ice craving. Besides that, she does not have any other significant symptoms at today visit. Past Medical History:     Significant for  1. Hyperlipidemia  2. Family history of early coronary artery disease  3. Osteoarthritis  4. Pulmonary hypertension     5. Morbid obesity status post gastric bypass surgery  6. Migraine headache  7. History of blood transfusion (required 8 units of blood transfusion in 2011 due to chronic bleeding per vagina, she required ablation followed by hysterectomy)  8. Depression/anxiety    Past Surgery History:    Significant for  1.  section in   2. Laparoscopic cholecystectomy in   3. Gastric bypass surgery in   4. Vaginal hysterectomy with right salpingo-oophorectomy in August 15, 2014  5. Right knee joint replacement in   6. Nasal septum surgery  7. Left wrist fracture surgery in                                                                           Social History:   She denies smoking, alcohol drinking or illicit drug abuse. She is currently living in [facility]. Family History:    Significant for coronary artery disease and one of her sister [required CABG at the age of 44] and one of her brother [required CABG at the age of 45]. Her father dying of massive myocardial infarction at the age of 43.   One of her brother has stomach cancer and her mother and maternal grandmother has lung cancer. Maternal grandfather and maternal aunt have bone cancer. Allergies  Adhesive tape  Ibuprofen        Review of Systems: \"Per interval history; otherwise 10 point ROS is negative. \"  Her energy level is low, appetite and sleep are pretty good. She doesn't have fever, chills, night sweats, cough, shortness of breath, chest pain, hemoptysis, or palpitations. Her bowel and bladder functions are normal. She denies nausea, vomiting, abdominal pain, diarrhea, constipation, dysuria, loss of appetite, or weight loss. She doesn't have neuropathy and she denies bleeding or clotting issues. She denies any pain in her body. Doesn't have anxiety or depression. The rest of the systems are unremarkable.      Vital Signs: LMP 06/04/2012      Physical Exam:    Labs:  Hematology:  Lab Results   Component Value Date    WBC 5.2 11/18/2020    RBC 4.75 11/18/2020    HGB 11.1 (L) 11/18/2020    HCT 34.9 (L) 11/18/2020    MCV 73.5 (L) 11/18/2020    MCH 23.4 (L) 11/18/2020    MCHC 31.8 (L) 11/18/2020    RDW 22.3 (H) 11/18/2020     11/18/2020    MPV 9.1 11/18/2020    BANDSPCT 8 03/17/2014    SEGSPCT 68.4 (H) 11/18/2020    EOSRELPCT 3.9 (H) 11/18/2020    BASOPCT 1.3 (H) 11/18/2020    LYMPHOPCT 19.8 (L) 11/18/2020    MONOPCT 6.6 (H) 11/18/2020    BANDABS 1.33 03/17/2014    SEGSABS 3.6 11/18/2020    EOSABS 0.2 11/18/2020    BASOSABS 0.1 11/18/2020    LYMPHSABS 1.0 11/18/2020    MONOSABS 0.3 11/18/2020    DIFFTYPE AUTOMATED DIFFERENTIAL 11/18/2020    WBCMORP RARE 03/17/2014    PLTM PLATELETS APPEAR NORMAL 03/13/2014     Lab Results   Component Value Date    ESR 16 08/20/2011     Chemistry:  Lab Results   Component Value Date     11/18/2020    K 3.9 11/18/2020    CL 97 (L) 11/18/2020    CO2 25 11/18/2020    BUN 9 11/18/2020    CREATININE 0.7 11/18/2020    GLUCOSE 83 11/18/2020    CALCIUM 8.9 11/18/2020    PROT 7.4 11/18/2020    LABALBU 4.1 11/18/2020    BILITOT 0.5 11/18/2020    ALKPHOS 152 (H) 11/18/2020    AST 68 (H) 11/18/2020    ALT 35 11/18/2020    LABGLOM >60 11/18/2020    GFRAA >60 11/18/2020    MG 1.7 06/29/2011    POCGLU 88 03/18/2014     No results found for: MMA, LDH, HOMOCYSTEINE  No components found for: LD  Lab Results   Component Value Date    TSHHS 2.830 05/08/2019    T4FREE 1.11 05/08/2019     Immunology:  Lab Results   Component Value Date    PROT 7.4 11/18/2020     No results found for: [place], KLFLCR  No results found for: B2M  Coagulation Panel:  Lab Results   Component Value Date    PROTIME 10.9 (L) 11/18/2020    INR 0.90 11/18/2020    APTT 27.1 11/18/2020    DDIMER 992 (H) 10/13/2012     Anemia Panel:  Lab Results   Component Value Date    FYVQRQKU33 180.6 (L) 11/18/2020    FOLATE 19.6 (H) 11/18/2020     Tumor Markers:  No results found for: , CEA, , LABCA2, PSA     Observations:  PHQ-9 Total Score: 0 (11/30/2020 10:04 AM)     Assessment   Microcytic hypochromic anemia  Morbid obesity-status post gastric bypass surgery    Plan:                                                                                                                   Masha ROLDANDomingo Esparza is a 78-year-old very pleasant female who was seen by [facility] on 11/16/20 for right upper quadrant abdominal pain. Since Ms. Casey Esparza has mild anemia (Hb 12.1) on 5/8/2019 blood test, she requested repeat CBC. It was done on 11/6/2020 and she is found to have microcytic hypochromic anemia (Hb 9.4, MCV 75.8, MCH 22.4). She had colonoscopy on November 13, 2020 in [place]. As per patient, she was found to have one polyp and it was removed during the procedure. Laboratory work ups done on 11/18/20 showed iron deficiency anemia [Hb 11.1, Hct 34.9, iron 31, TIBC 411, ferritin 10, iron saturation 8%] and vitamin B12 deficiency [B12 - 180.6]. On November 30, 2020, she presented to me for follow-up via telephone visit.   On today visit, I reviewed with her findings on laboratory test.    Since she is found to have severe iron deficiency, I recommend her to have iron infusion as soon as possible. She is scheduled for first dose of Injectafer infusion today, 11/30/20 and she will receive her second dose of Injectafer infusion on December 7, 2020. I believe her iron deficiency anemia is due to malabsorption secondary to gastric bypass surgery. She is also found to have vitamin B12 deficiency even if she is taking multivitamin daily. I recommend her to start vitamin B12 1000 mcg daily and I sent a prescription for her today. Otherwise, I will reevaluate her again in 4 months and I will repeat all the blood tests a week before her next appointment. I answered all her questions and concerns for today. I asked her to follow up with primary care physician, Jackie Ritter on regular basis. I will continue to keep you updated on her progress. Thank you for allowing me to participate in the care of this very pleasant patient. Recent imaging and labs were reviewed and discussed with the patient.   Total time spent with patient - 12 minutes

## 2020-11-30 NOTE — PROGRESS NOTES
Tolerated infusion well. Reviewed discharge instruction, voiced understanding. Copies of AVS given. Pt discharged home. Pt to exit via steady gait. Orders Placed This Encounter   Medications    ferric carboxymaltose (INJECTAFER) 750 mg in sodium chloride 0.9 % 250 mL IVPB     With verification, confirm documentation of current weight, ordered weight-type, and dose calculation.     0.9 % sodium chloride infusion    sodium chloride flush 0.9 % injection 10 mL

## 2020-12-08 ENCOUNTER — HOSPITAL ENCOUNTER (OUTPATIENT)
Dept: INFUSION THERAPY | Age: 50
Setting detail: INFUSION SERIES
Discharge: HOME | End: 2020-12-08
Payer: COMMERCIAL

## 2020-12-08 VITALS
HEART RATE: 78 BPM | SYSTOLIC BLOOD PRESSURE: 149 MMHG | OXYGEN SATURATION: 97 % | RESPIRATION RATE: 16 BRPM | TEMPERATURE: 98.1 F | DIASTOLIC BLOOD PRESSURE: 87 MMHG

## 2020-12-08 DIAGNOSIS — D50.9 IRON DEFICIENCY ANEMIA, UNSPECIFIED IRON DEFICIENCY ANEMIA TYPE: Primary | ICD-10-CM

## 2020-12-08 DIAGNOSIS — K90.9 INTESTINAL MALABSORPTION, UNSPECIFIED TYPE: ICD-10-CM

## 2020-12-08 PROCEDURE — 2580000003 HC RX 258: Performed by: INTERNAL MEDICINE

## 2020-12-08 PROCEDURE — 6360000002 HC RX W HCPCS: Performed by: INTERNAL MEDICINE

## 2020-12-08 PROCEDURE — 99211 OFF/OP EST MAY X REQ PHY/QHP: CPT

## 2020-12-08 PROCEDURE — 96365 THER/PROPH/DIAG IV INF INIT: CPT

## 2020-12-08 RX ORDER — EPINEPHRINE 1 MG/ML
0.3 INJECTION, SOLUTION, CONCENTRATE INTRAVENOUS PRN
Status: CANCELLED | OUTPATIENT
Start: 2020-12-08

## 2020-12-08 RX ORDER — DIPHENHYDRAMINE HYDROCHLORIDE 50 MG/ML
50 INJECTION INTRAMUSCULAR; INTRAVENOUS ONCE
Status: CANCELLED | OUTPATIENT
Start: 2020-12-08

## 2020-12-08 RX ORDER — SODIUM CHLORIDE 0.9 % (FLUSH) 0.9 %
10 SYRINGE (ML) INJECTION PRN
Status: DISCONTINUED | OUTPATIENT
Start: 2020-12-08 | End: 2020-12-09 | Stop reason: HOSPADM

## 2020-12-08 RX ORDER — METHYLPREDNISOLONE SODIUM SUCCINATE 125 MG/2ML
125 INJECTION, POWDER, LYOPHILIZED, FOR SOLUTION INTRAMUSCULAR; INTRAVENOUS ONCE
Status: CANCELLED | OUTPATIENT
Start: 2020-12-08

## 2020-12-08 RX ORDER — SODIUM CHLORIDE 0.9 % (FLUSH) 0.9 %
5 SYRINGE (ML) INJECTION PRN
Status: CANCELLED | OUTPATIENT
Start: 2020-12-08

## 2020-12-08 RX ORDER — SODIUM CHLORIDE 0.9 % (FLUSH) 0.9 %
10 SYRINGE (ML) INJECTION PRN
Status: CANCELLED | OUTPATIENT
Start: 2020-12-08

## 2020-12-08 RX ORDER — SODIUM CHLORIDE 9 MG/ML
INJECTION, SOLUTION INTRAVENOUS CONTINUOUS
Status: CANCELLED | OUTPATIENT
Start: 2020-12-08

## 2020-12-08 RX ORDER — HEPARIN SODIUM (PORCINE) LOCK FLUSH IV SOLN 100 UNIT/ML 100 UNIT/ML
500 SOLUTION INTRAVENOUS PRN
Status: CANCELLED | OUTPATIENT
Start: 2020-12-08

## 2020-12-08 RX ADMIN — SODIUM CHLORIDE, PRESERVATIVE FREE 10 ML: 5 INJECTION INTRAVENOUS at 15:00

## 2020-12-08 RX ADMIN — SODIUM CHLORIDE, PRESERVATIVE FREE 10 ML: 5 INJECTION INTRAVENOUS at 14:40

## 2020-12-08 RX ADMIN — FERRIC CARBOXYMALTOSE INJECTION 750 MG: 50 INJECTION, SOLUTION INTRAVENOUS at 14:45

## 2020-12-08 NOTE — PLAN OF CARE
Ambulatory to unit room 1 for Injectafer. Reorientated to unit. Procedure and plan of care explained. Questions answered. Understanding verbalized.

## 2020-12-08 NOTE — DISCHARGE SUMMARY
Tolerated Injectafer well. Reviewed discharge instructions, understanding verbalized. Copies of AVS given to take home. Patient discharged home. Down to exit per self. Orders Placed This Encounter   Medications    ferric carboxymaltose (INJECTAFER) 750 mg in sodium chloride 0.9 % 250 mL IVPB     With verification, confirm documentation of current weight, ordered weight-type, and dose calculation.     sodium chloride flush 0.9 % injection 10 mL

## 2021-11-19 DIAGNOSIS — I50.30 DIASTOLIC CONGESTIVE HEART FAILURE, UNSPECIFIED HF CHRONICITY (HCC): ICD-10-CM

## 2021-11-23 RX ORDER — FUROSEMIDE 20 MG/1
TABLET ORAL
Qty: 180 TABLET | Refills: 3 | OUTPATIENT
Start: 2021-11-23

## 2021-11-23 RX ORDER — POTASSIUM CHLORIDE 1500 MG/1
TABLET, EXTENDED RELEASE ORAL
Qty: 90 TABLET | OUTPATIENT
Start: 2021-11-23

## 2021-12-15 DIAGNOSIS — I50.30 DIASTOLIC CONGESTIVE HEART FAILURE, UNSPECIFIED HF CHRONICITY (HCC): ICD-10-CM

## 2021-12-15 RX ORDER — FUROSEMIDE 20 MG/1
TABLET ORAL
Qty: 180 TABLET | Refills: 3 | OUTPATIENT
Start: 2021-12-15

## 2021-12-15 RX ORDER — POTASSIUM CHLORIDE 1500 MG/1
TABLET, EXTENDED RELEASE ORAL
Qty: 90 TABLET | OUTPATIENT
Start: 2021-12-15

## 2021-12-21 DIAGNOSIS — I50.30 DIASTOLIC CONGESTIVE HEART FAILURE, UNSPECIFIED HF CHRONICITY (HCC): ICD-10-CM

## 2021-12-21 NOTE — TELEPHONE ENCOUNTER
Pt. Called wanting refill on lasix and potassium, was last seen 10/2020. Scheduled pt. For 1/3/22. Pt.  Out of medication would like refill until seen

## 2021-12-22 RX ORDER — POTASSIUM CHLORIDE 1.5 G/1.77G
20 POWDER, FOR SOLUTION ORAL 2 TIMES DAILY
Qty: 60 PACKET | Refills: 0 | Status: SHIPPED | OUTPATIENT
Start: 2021-12-22 | End: 2022-01-03 | Stop reason: SDUPTHER

## 2021-12-22 RX ORDER — FUROSEMIDE 20 MG/1
20 TABLET ORAL 2 TIMES DAILY
Qty: 60 TABLET | Refills: 0 | Status: SHIPPED | OUTPATIENT
Start: 2021-12-22 | End: 2022-01-03 | Stop reason: SDUPTHER

## 2022-01-03 ENCOUNTER — OFFICE VISIT (OUTPATIENT)
Dept: CARDIOLOGY CLINIC | Age: 52
End: 2022-01-03
Payer: COMMERCIAL

## 2022-01-03 VITALS
HEART RATE: 72 BPM | WEIGHT: 236 LBS | DIASTOLIC BLOOD PRESSURE: 82 MMHG | BODY MASS INDEX: 43.43 KG/M2 | HEIGHT: 62 IN | SYSTOLIC BLOOD PRESSURE: 120 MMHG

## 2022-01-03 DIAGNOSIS — R01.1 MURMUR: Primary | ICD-10-CM

## 2022-01-03 DIAGNOSIS — I50.30 DIASTOLIC CONGESTIVE HEART FAILURE, UNSPECIFIED HF CHRONICITY (HCC): ICD-10-CM

## 2022-01-03 PROCEDURE — 99214 OFFICE O/P EST MOD 30 MIN: CPT | Performed by: INTERNAL MEDICINE

## 2022-01-03 PROCEDURE — G8417 CALC BMI ABV UP PARAM F/U: HCPCS | Performed by: INTERNAL MEDICINE

## 2022-01-03 PROCEDURE — G8484 FLU IMMUNIZE NO ADMIN: HCPCS | Performed by: INTERNAL MEDICINE

## 2022-01-03 PROCEDURE — G8427 DOCREV CUR MEDS BY ELIG CLIN: HCPCS | Performed by: INTERNAL MEDICINE

## 2022-01-03 PROCEDURE — 3017F COLORECTAL CA SCREEN DOC REV: CPT | Performed by: INTERNAL MEDICINE

## 2022-01-03 PROCEDURE — 1036F TOBACCO NON-USER: CPT | Performed by: INTERNAL MEDICINE

## 2022-01-03 RX ORDER — POTASSIUM CHLORIDE 1.5 G/1.77G
20 POWDER, FOR SOLUTION ORAL 2 TIMES DAILY
Qty: 120 PACKET | Refills: 3 | Status: SHIPPED
Start: 2022-01-03 | End: 2022-02-15 | Stop reason: SINTOL

## 2022-01-03 RX ORDER — ONDANSETRON 4 MG/1
TABLET, FILM COATED ORAL
COMMUNITY
Start: 2021-11-19

## 2022-01-03 RX ORDER — FUROSEMIDE 20 MG/1
20 TABLET ORAL 2 TIMES DAILY
Qty: 60 TABLET | Refills: 3 | Status: SHIPPED | OUTPATIENT
Start: 2022-01-03 | End: 2022-06-06 | Stop reason: SDUPTHER

## 2022-01-03 NOTE — PATIENT INSTRUCTIONS
**It is YOUR responsibilty to bring medication bottles and/or updated medication list to [address] [place] [address]. This will allow us to better serve you and all your healthcare needs**  Please be informed that if you contact our office outside of normal business hours the physician on call cannot help with any scheduling or rescheduling issues, procedure instruction questions or any type of medication issue. We advise you for any urgent/emergency that you go to the nearest emergency room! PLEASE CALL OUR OFFICE DURING NORMAL BUSINESS HOURS    Monday - Friday   8 am to 5 pm    [facility] [address]: [phone]    [place]:  [phone]  Please hold on to these instructions the  will call you within 1-9 business days when we receive authorization from your insurance. Echocardiogram    WHAT TO EXPECT:   ? This test will take approximately 45 minutes. ? It is an ultrasound of the heart. ? It can look at the valves and chambers inside the heart   ? There is no special instructions for this test.     If you are unable to keep this appointment, please notify us 24 hours prior to test at [phone].

## 2022-01-03 NOTE — LETTER
Rose Mary Shaffer  1970  [id number]    Have you had any Chest Pain that is not new? - No   DO EKG IF: Patient has a Heart Rate above 100 or below 40     CAD (Coronary Artery Disease) patient should have one on file every 6 months      Have you had any Shortness of Breath - No    Have you had any dizziness - No    Have you had any palpitations that are not new? - No    Do you have any edema - swelling in No      When did you have your last labs drawn 11/18/2020  Where did you have them done   What doctor ordered Jesus Lau  If we do not have these labs you are retrieve these labs for these providers!     Do you have a surgery or procedure scheduled in the near future - No   Ask patient if they want to sign up for [employer] [facility] if they are not already signed up     Check to see if we have an E-MAIL on file for the patient     Check medication list thoroughly!!! AND RECONCILE OUTSIDE MEDICATIONS  If dose has changed change the entire order not just the MG  BE SURE TO ASK PATIENT IF THEY NEED MEDICATION REFILLS     At check out add to every patient's \"wrap up\" the following dot phrase AFTERHOURSEDUCATION and ensure we explain this to our patients

## 2022-01-03 NOTE — PROGRESS NOTES
Yolanda Sloan MD        OFFICE  FOLLOWUP NOTE    Chief complaints: patient is here for management of diastolic CHF, pulmonary HTN, obesity,migraine,depression    Subjective: patient feels better, no chest pain, no shortness of breath, no dizziness, no palpitations    NICKI Anderson is a 46 y. o.year old who  has a past medical history of Anemia, Anesthesia, Arthritis, Blood pressure elevated without history of HTN, Blood transfusion, Cardiomegaly, Chronic sinusitis, Depression, Environmental allergies, Family history of early CAD, H/O cardiovascular stress test, H/O echocardiogram, H/O echocardiogram, H/O gastric bypass, H/O hypoglycemia, History of blood transfusion, History of cardiac cath, History of cardiac cath, History of nuclear stress test, HX OTHER MEDICAL, Insomnia, Migraine headache, Obesity, Other bone involvement in diseases classified elsewhere, Presence of IVC filter, Prolonged emergence from general anesthesia, and Seasonal allergies. and presents for management of diastolic CHF, pulmonary HTN, obesity,migraine,depression which are well controlled      Patient had covid vaccine/    Current Outpatient Medications   Medication Sig Dispense Refill    ondansetron (ZOFRAN) 4 MG tablet       furosemide (LASIX) 20 MG tablet Take 1 tablet by mouth 2 times daily 60 tablet 0    potassium chloride (KLOR-CON) 20 MEQ packet Take 20 mEq by mouth 2 times daily 60 packet 0    Multiple Vitamins-Minerals (MULTI COMPLETE PO) Take by mouth      cetirizine (ZYRTEC) 10 MG tablet Take 10 mg by mouth daily.  sertraline (ZOLOFT) 50 MG tablet Take 50 mg by mouth daily.  omeprazole (PRILOSEC) 40 MG capsule Take 40 mg by mouth daily.  cyanocobalamin 1000 MCG tablet Take 1 tablet by mouth daily (Patient not taking: Reported on 1/3/2022) 90 tablet 5     No current facility-administered medications for this visit.      Allergies: Tape [adhesive tape] and Ibuprofen  Past Medical History: Diagnosis Date    Anemia     Anesthesia     Trouble waking up.  Arthritis     right knee-S/p Orthovisc injections X3    Blood pressure elevated without history of HTN 2013    per [address] visit- 124/90, 124/92    Blood transfusion Last 2011    No reaction    Cardiomegaly 10/13/2012    per CT chest-in Epic    Chronic sinusitis     Depression     Environmental allergies     Family history of early CAD     Sister- CABG- age 39; Father- Massive MI- age 43    H/O cardiovascular stress test 6/10/05     EF 69% Normal study.  H/O echocardiogram ; EF55% mild-mod MR,TR, mod pulm htn 2005/ EF 52%,Mild MR and TR    H/O echocardiogram 2018    EF 89-11%  Grade 1 diastolic dysfunction, sclerotic, but non-stenotic aortic valve, Mild MR, Mild-Mod TR, Mild-Mod PHTN, no pericardial effusion    H/O gastric bypass     H/O hypoglycemia     History of blood transfusion     History of cardiac cath     No signif.CAD.  History of cardiac cath 2018    normal coronaries    History of nuclear stress test 08/10/2018    HX OTHER MEDICAL 13    EVENT MONITOR: -Sinus rhythm, sinus bradycardia. 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     Prolonged emergence from general anesthesia     Seasonal allergies      Past Surgical History:   Procedure Laterality Date     SECTION  1996    x 1    CHOLECYSTECTOMY  1996    Lap Choley    COLONOSCOPY      DIAGNOSTIC CARDIAC CATH LAB PROCEDURE  8/15/05    2005, No significant CAD    ENDOSCOPY, COLON, DIAGNOSTIC      ENDOSCOPY, COLON, DIAGNOSTIC  10/18/2018    Hiatal hernia    GASTRIC BYPASS SURGERY  2006    pre-wt 341#lb    HYSTERECTOMY  8/15/14    LAVH with Right S and O    JOINT REPLACEMENT Right 2016    KNEE ARTHROSCOPY Right 09/17/14    medial/lateral menisectomy, chondroplasty    KNEE SURGERY  2006    right - centered knee cap and scrapes bone spurs and arthritis.  NASAL SEPTUM SURGERY      AK ESOPHAGOGASTRODUODENOSCOPY TRANSORAL DIAGNOSTIC N/A 10/18/2018    EGD DIAGNOSTIC ONLY / POSSIBLE BALLOON performed by Ayaan Aggarwal MD at [address] [facility]   2008    Left - fractured x 2     Family History   Problem Relation Age of Onset    Heart Disease Mother     Diabetes Mother     Cancer Mother         lung    Heart Disease Father         Massive MI    Early Death Father 43        Massive MI    Other Sister         pulmonary HTN, O2 ATC    Diabetes Sister     Heart Disease Sister 39        CABG at age 43    Cancer Brother 43        stomach - remission    Heart Disease Brother         MI, CABG at age 45   Filomena Pro High Cholesterol Brother     High Blood Pressure Brother     Pacemaker Brother         defibulator    Diabetes Daughter     High Blood Pressure Daughter     Other Daughter         Bladder reflux     Social History     Tobacco Use    Smoking status: Never Smoker    Smokeless tobacco: Never Used    Tobacco comment: Exposed to second hand smoke only.    Substance Use Topics    Alcohol use: Yes     Comment: social      @[email or web address]@  Review of Systems:   · Constitutional: No Fever or Weight Loss   · Eyes: No Decreased Vision  · ENT: No Headaches, Hearing Loss or Vertigo  · Cardiovascular: No chest pain, dyspnea on exertion, palpitations or loss of consciousness  · Respiratory: No cough or wheezing    · Gastrointestinal: No abdominal pain, appetite loss, blood in stools, constipation, diarrhea or heartburn  · Genitourinary: No dysuria, trouble voiding, or hematuria  · Musculoskeletal:  No gait disturbance, weakness or joint complaints  · Integumentary: No rash or pruritis  · Neurological: No TIA or stroke symptoms  · Psychiatric: No anxiety or depression  · Endocrine: No malaise, fatigue or temperature intolerance  · Hematologic/Lymphatic: No bleeding problems, blood clots or swollen lymph nodes  · Allergic/Immunologic: No nasal congestion or hives  All systems negative except as marked. Objective:  /82   Pulse 72   Ht 5' 2\" (1.575 m)   Wt 236 lb (107 kg)   LMP 06/04/2012   BMI 43.16 kg/m²   Wt Readings from Last 3 Encounters:   01/03/22 236 lb (107 kg)   11/18/20 242 lb 3.2 oz (109.9 kg)   10/08/20 249 lb 9.6 oz (113.2 kg)     Body mass index is 43.16 kg/m². GENERAL - Alert, oriented, pleasant, in no apparent distress,normal grooming  HEENT  pupils are intact, cornea intact, conjunctive normal color, ears are normal in exam,  Neck - Supple. No jugular venous distention noted. No carotid bruits, no apical -carotid delay  Respiratory:  Normal breath sounds, No respiratory distress, No wheezing, No chest tenderness. ,no use of accessory muscles, diaphragm movement is normal  Cardiovascular: (PMI) apex non displaced,no lifts no thrills, no s3,no s4, Normal heart rate, Normal rhythm,+ murmurs, No rubs, No gallops. Carotid arteries pulse and amplitude are normal no bruit, no abdominal bruit noted ( normal abdominal aorta ausculation),   Extremities - No cyanosis, clubbing, or significant edema, no varicose veins    Abdomen  No masses, tenderness, or organomegaly, no hepato-splenomegally, no bruits  Musculoskeletal  No significant edema, no kyphosis or scoliosis, no deformity in any extremity noted, muscle strength and tone are normal  Skin: no ulcer,no scar,no stasis dermatitis   Neurologic  alert oriented times 3,Cranial nerves II through XII are grossly intact. There were no gross focal neurologic abnormalities.    Psychiatric: normal mood and affect    Lab Results   Component Value Date    TROPONINI 0.108 03/13/2014    TROPONINI 0.008 06/27/2011     BNP:  No results found for: BNP  PT/INR:  No results found for: PTINR  No results found for: LABA1C  Lab Results   Component Value Date    CHOL 216 11/08/2013    TRIG 95 11/08/2013    HDL 63 11/08/2013    LDLCALC 134 11/08/2013    LDLDIRECT 120 (H) 04/23/2012     Lab Results   Component Value Date    ALT 35 11/18/2020    AST 68 (H) 11/18/2020     TSH:  No results found for: TSH    Impression:  Azalia Felty is a 46 y. o.year old who  has a past medical history of Anemia, Anesthesia, Arthritis, Blood pressure elevated without history of HTN, Blood transfusion, Cardiomegaly, Chronic sinusitis, Depression, Environmental allergies, Family history of early CAD, H/O cardiovascular stress test, H/O echocardiogram, H/O echocardiogram, H/O gastric bypass, H/O hypoglycemia, History of blood transfusion, History of cardiac cath, History of cardiac cath, History of nuclear stress test, HX OTHER MEDICAL, Insomnia, Migraine headache, Obesity, Other bone involvement in diseases classified elsewhere, Presence of IVC filter, Prolonged emergence from general anesthesia, and Seasonal allergies. and presents with     Plan:  1. Tiredness: better off gabapentin  2. Shingles: resolved last yr, recommend to get shingle vaccine  3. Myrmur: echo ordered  4. Stress and anxiety: better, she is remarried, happy now  5. Diastolic CHF and pulmonary HTN: refill  lasix and refill low dose KCL. 6. NIGHT SWEATS: recommend to get holter monitor, she has night mare also, she is not interested for now. 7. Leg swelling: from diastolic CHF  8. Obesity: s.p gastric bypass'lost > 25 lbs recently  9. Migraine: stable, off medications  10. Depression:stable, continue [employer] maintenance: exerise and diet  All labs, medications and tests reviewed, continue all other medications of all above medical condition listed as is.

## 2022-01-20 ENCOUNTER — PROCEDURE VISIT (OUTPATIENT)
Dept: CARDIOLOGY CLINIC | Age: 52
End: 2022-01-20
Payer: COMMERCIAL

## 2022-01-20 DIAGNOSIS — R01.1 MURMUR: Primary | ICD-10-CM

## 2022-01-20 DIAGNOSIS — I50.30 DIASTOLIC CONGESTIVE HEART FAILURE, UNSPECIFIED HF CHRONICITY (HCC): ICD-10-CM

## 2022-01-20 LAB
LV EF: 58 %
LVEF MODALITY: NORMAL

## 2022-01-20 PROCEDURE — 93306 TTE W/DOPPLER COMPLETE: CPT | Performed by: INTERNAL MEDICINE

## 2022-01-21 ENCOUNTER — TELEPHONE (OUTPATIENT)
Dept: CARDIOLOGY CLINIC | Age: 52
End: 2022-01-21

## 2022-01-21 NOTE — TELEPHONE ENCOUNTER
Summary   Left ventricular systolic function is normal with an ejection fraction of   55-60%. Grade I diastolic dysfunction. Mild to moderate septal wall asymmetrical left ventricular hypertrophy. Sclerotic, but non-stenotic aortic valve. Elevated LVOT velocities. Right ventricular systolic pressure of 48 mmHg consistent with mild to   moderate pulmonary hypertension. No evidence of pericardial effusion. Spoke to patient regarding results of echo. Patient voiced understanding.

## 2022-02-15 ENCOUNTER — TELEPHONE (OUTPATIENT)
Dept: CARDIOLOGY CLINIC | Age: 52
End: 2022-02-15

## 2022-02-15 RX ORDER — POTASSIUM CHLORIDE 20 MEQ/1
20 TABLET, EXTENDED RELEASE ORAL 2 TIMES DAILY
Qty: 60 TABLET | Refills: 3 | Status: SHIPPED | OUTPATIENT
Start: 2022-02-15 | End: 2022-06-06 | Stop reason: SDUPTHER

## 2022-02-15 NOTE — TELEPHONE ENCOUNTER
Patient called she is having a issue with Powder Potassium its is causing stomach pain wants to go back to pill form

## 2022-04-19 NOTE — TELEPHONE ENCOUNTER
size, moderate intensity, partially    reversible perfusion defect in anterior wall noted. Breast tissue attenuation    artifact cannot be excluded.        CATH 8/30/2018
3 or 4

## 2022-05-19 ENCOUNTER — OFFICE VISIT (OUTPATIENT)
Dept: CARDIOLOGY CLINIC | Age: 52
End: 2022-05-19
Payer: COMMERCIAL

## 2022-05-19 VITALS
WEIGHT: 220.4 LBS | HEIGHT: 62 IN | HEART RATE: 72 BPM | SYSTOLIC BLOOD PRESSURE: 126 MMHG | DIASTOLIC BLOOD PRESSURE: 84 MMHG | BODY MASS INDEX: 40.56 KG/M2

## 2022-05-19 DIAGNOSIS — I50.30 DIASTOLIC CONGESTIVE HEART FAILURE, UNSPECIFIED HF CHRONICITY (HCC): Primary | ICD-10-CM

## 2022-05-19 DIAGNOSIS — Z01.810 PRE-OPERATIVE CARDIOVASCULAR EXAMINATION: Primary | ICD-10-CM

## 2022-05-19 PROCEDURE — 3017F COLORECTAL CA SCREEN DOC REV: CPT | Performed by: INTERNAL MEDICINE

## 2022-05-19 PROCEDURE — G8417 CALC BMI ABV UP PARAM F/U: HCPCS | Performed by: INTERNAL MEDICINE

## 2022-05-19 PROCEDURE — G8427 DOCREV CUR MEDS BY ELIG CLIN: HCPCS | Performed by: INTERNAL MEDICINE

## 2022-05-19 PROCEDURE — 99214 OFFICE O/P EST MOD 30 MIN: CPT | Performed by: INTERNAL MEDICINE

## 2022-05-19 PROCEDURE — 1036F TOBACCO NON-USER: CPT | Performed by: INTERNAL MEDICINE

## 2022-05-19 PROCEDURE — 93000 ELECTROCARDIOGRAM COMPLETE: CPT | Performed by: INTERNAL MEDICINE

## 2022-05-19 NOTE — PROGRESS NOTES
Christian Brenner MD        OFFICE  FOLLOWUP NOTE    Chief complaints: patient is here for management of diastolic CHF, pulmonary HTN, obesity,migraine,depression      Echo follow up for murmur: possible HOCM ( + CAYLA AND ELEVATED LVOT velocity)    Subjective: + shortness of breath, + cough no dizziness, no palpitations    HPI Soham Rahman is a 46 y. o.year old who  has a past medical history of Anemia, Anesthesia, Arthritis, Blood pressure elevated without history of HTN, Blood transfusion, Cardiomegaly, Chronic sinusitis, Depression, Environmental allergies, Family history of early CAD, H/O cardiovascular stress test, H/O echocardiogram, H/O echocardiogram, H/O gastric bypass, H/O hypoglycemia, History of blood transfusion, History of cardiac cath, History of cardiac cath, History of nuclear stress test, Hx of Doppler echocardiogram, HX OTHER MEDICAL, Insomnia, Migraine headache, Obesity, Other bone involvement in diseases classified elsewhere, Presence of IVC filter, Prolonged emergence from general anesthesia, and Seasonal allergies. and presents for management of diastolic CHF, pulmonary HTN, obesity,migraine,depression which are well controlled      Current Outpatient Medications   Medication Sig Dispense Refill    potassium chloride (KLOR-CON M) 20 MEQ extended release tablet Take 1 tablet by mouth 2 times daily 60 tablet 3    ondansetron (ZOFRAN) 4 MG tablet       furosemide (LASIX) 20 MG tablet Take 1 tablet by mouth 2 times daily 60 tablet 3    Multiple Vitamins-Minerals (MULTI COMPLETE PO) Take by mouth      cetirizine (ZYRTEC) 10 MG tablet Take 10 mg by mouth daily.  sertraline (ZOLOFT) 50 MG tablet Take 50 mg by mouth daily.  omeprazole (PRILOSEC) 40 MG capsule Take 40 mg by mouth daily.  cyanocobalamin 1000 MCG tablet Take 1 tablet by mouth daily (Patient not taking: Reported on 1/3/2022) 90 tablet 5     No current facility-administered medications for this visit. Allergies: Tape [adhesive tape] and Ibuprofen  Past Medical History:   Diagnosis Date    Anemia     Anesthesia     Trouble waking up.  Arthritis     right knee-S/p Orthovisc injections X3    Blood pressure elevated without history of HTN 2013    per [address] visit- 124/90, 124/92    Blood transfusion Last 2011    No reaction    Cardiomegaly 10/13/2012    per CT chest-in Epic    Chronic sinusitis     Depression     Environmental allergies     Family history of early CAD     Sister- CABG- age 39; Father- Massive MI- age 43    H/O cardiovascular stress test 06/10/2005     EF 69% Normal study.  H/O echocardiogram ; EF55% mild-mod MR,TR, mod pulm htn / EF 52%,Mild MR and TR    H/O echocardiogram 2018    EF 96-59%  Grade 1 diastolic dysfunction, sclerotic, but non-stenotic aortic valve, Mild MR, Mild-Mod TR, Mild-Mod PHTN, no pericardial effusion    H/O gastric bypass     H/O hypoglycemia     History of blood transfusion     History of cardiac cath     No signif.CAD.  History of cardiac cath 2018    normal coronaries    History of nuclear stress test 08/10/2018    Hx of Doppler echocardiogram 2022    EF 82-20% Grade I diastolic dysfunction. Mild to moderate septal wall asymmetrical LV hypertrophy. Sclerotic, but non-stenotic aortic valve. Elevated LVOT velocities. Mild to mod pulmonary htn.  HX OTHER MEDICAL 2013    EVENT MONITOR: -Sinus rhythm, sinus bradycardia. 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     Prolonged emergence from general anesthesia     Seasonal allergies      Past Surgical History:   Procedure Laterality Date     SECTION  1996    x [address]  COLONOSCOPY      DIAGNOSTIC CARDIAC CATH LAB PROCEDURE  8/15/05    2005, No significant CAD    ENDOSCOPY, COLON, DIAGNOSTIC  2014    ENDOSCOPY, COLON, DIAGNOSTIC  10/18/2018    Hiatal hernia    GASTRIC BYPASS SURGERY  2006    pre-wt 341#lb    HYSTERECTOMY  8/15/14    LAVH with Right S and O    JOINT REPLACEMENT Right 2016    KNEE ARTHROSCOPY Right 09/17/14    medial/lateral menisectomy, chondroplasty    KNEE SURGERY  2006    right - centered knee cap and scrapes bone spurs and arthritis.  NASAL SEPTUM SURGERY      OR ESOPHAGOGASTRODUODENOSCOPY TRANSORAL DIAGNOSTIC N/A 10/18/2018    EGD DIAGNOSTIC ONLY / POSSIBLE [facility]ALLOON [facility]  [facility]    Left - fractured x 2     Family History   Problem Relation Age of Onset    Heart Disease Mother     Diabetes Mother     Cancer Mother         lung    Heart Disease Father         Massive MI    Early Death Father 43        Massive MI    Other Sister         pulmonary HTN, O2 ATC    Diabetes Sister     Heart Disease Sister 39        CABG at age 43    Cancer Brother 43        stomach - remission    Heart Disease Brother         MI, CABG at age 45   [place] High Cholesterol Brother     High Blood Pressure Brother     Pacemaker Brother         defibulator    Diabetes Daughter     High Blood Pressure Daughter     Other Daughter         Bladder reflux     Social History     Tobacco Use    Smoking status: Never Smoker    Smokeless tobacco: Never Used    Tobacco comment: Exposed to second hand smoke only.    Substance Use Topics    Alcohol use: Yes     Comment: social      @EMA@  Review of Systems:   · Constitutional: No Fever or Weight Loss   · Eyes: No Decreased Vision  · ENT: No Headaches, Hearing Loss or Vertigo  · Cardiovascular: No chest pain, dyspnea on exertion, palpitations or loss of consciousness  · Respiratory: No cough or wheezing    · Gastrointestinal: No abdominal pain, appetite loss, blood in stools, constipation, diarrhea or heartburn  · Genitourinary: No dysuria, trouble voiding, or hematuria  · Musculoskeletal:  No gait disturbance, weakness or joint complaints  · Integumentary: No rash or pruritis  · Neurological: No TIA or stroke symptoms  · Psychiatric: No anxiety or depression  · Endocrine: No malaise, fatigue or temperature intolerance  · Hematologic/Lymphatic: No bleeding problems, blood clots or swollen lymph nodes  · Allergic/Immunologic: No nasal congestion or hives  All systems negative except as marked. Objective:  /84   Pulse 72   Ht 5' 2\" (1.575 m)   Wt 220 lb 6.4 oz (100 kg)   LMP 06/04/2012   BMI 40.31 kg/m²   Wt Readings from Last 3 Encounters:   05/19/22 220 lb 6.4 oz (100 kg)   01/03/22 236 lb (107 kg)   11/18/20 242 lb 3.2 oz (109.9 kg)     Body mass index is 40.31 kg/m². GENERAL - Alert, oriented, pleasant, in no apparent distress,normal grooming  HEENT  pupils are intact, cornea intact, conjunctive normal color, ears are normal in exam,  Neck - Supple. No jugular venous distention noted. No carotid bruits, no apical -carotid delay  Respiratory:  Normal breath sounds, No respiratory distress, No wheezing, No chest tenderness. ,no use of accessory muscles, diaphragm movement is normal  Cardiovascular: (PMI) apex non displaced,no lifts no thrills, no s3,no s4, Normal heart rate, Normal rhythm, + murmurs, No rubs, No gallops.  Carotid arteries pulse and amplitude are normal no bruit, no abdominal bruit noted ( normal abdominal aorta ausculation),   Extremities - No cyanosis, clubbing, or significant edema, no varicose veins    Abdomen  No masses, tenderness, or organomegaly, no hepato-splenomegally, no bruits  Musculoskeletal  No significant edema, no kyphosis or scoliosis, no deformity in any extremity noted, muscle strength and tone are normal  Skin: no ulcer,no scar,no stasis dermatitis   Neurologic  alert oriented times 3,Cranial nerves II through XII are grossly intact. There were no gross focal neurologic abnormalities. Psychiatric: normal mood and affect    Lab Results   Component Value Date    TROPONINI 0.108 03/13/2014    TROPONINI 0.008 06/27/2011     BNP:  No results found for: BNP  PT/INR:  No results found for: PTINR  No results found for: LABA1C  Lab Results   Component Value Date    CHOL 216 11/08/2013    TRIG 95 11/08/2013    HDL 63 11/08/2013    LDLCALC 134 11/08/2013    LDLDIRECT 120 (H) 04/23/2012     Lab Results   Component Value Date    ALT 35 11/18/2020    AST 68 (H) 11/18/2020     TSH:  No results found for: TSH      EKG: NSR    Impression:  Becky Eastman is a 46 y. o.year old who  has a past medical history of Anemia, Anesthesia, Arthritis, Blood pressure elevated without history of HTN, Blood transfusion, Cardiomegaly, Chronic sinusitis, Depression, Environmental allergies, Family history of early CAD, H/O cardiovascular stress test, H/O echocardiogram, H/O echocardiogram, H/O gastric bypass, H/O hypoglycemia, History of blood transfusion, History of cardiac cath, History of cardiac cath, History of nuclear stress test, Hx of Doppler echocardiogram, HX OTHER MEDICAL, Insomnia, Migraine headache, Obesity, Other bone involvement in diseases classified elsewhere, Presence of IVC filter, Prolonged emergence from general anesthesia, and Seasonal allergies. and presents with     Plan:  1. Tiredness: better off gabapentin  2. Cough: not in CHF  3. Shingles: resolved last yr, recommend to get shingle vaccine  4. Murrmur: elevated LVOT velocity, no stenosis, possible CAYLA noted, will get OSVALDO  5. Stress and anxiety: better, she is remarried, happy now  6. Diastolic CHF and pulmonary HTN: refill  lasix and refill low dose KCL. 7. NIGHT SWEATS: recommend to get holter monitor, she has night mare also, she is not interested for now. 8. Leg swelling: from diastolic CHF  9. Obesity: s.p gastric bypass'lost > 40 lbs recently  10.  Migraine: stable, off medications  11. Depression:stable, continue zolofHealth maintenance: exerise and diet  All labs, medications and tests reviewed, continue all other medications of all above medical condition listed as is.     [unfilled]

## 2022-05-31 ENCOUNTER — NURSE ONLY (OUTPATIENT)
Dept: CARDIOLOGY CLINIC | Age: 52
End: 2022-05-31
Payer: COMMERCIAL

## 2022-05-31 ENCOUNTER — HOSPITAL ENCOUNTER (OUTPATIENT)
Age: 52
Setting detail: SPECIMEN
Discharge: HOME | End: 2022-05-31
Payer: COMMERCIAL

## 2022-05-31 VITALS — SYSTOLIC BLOOD PRESSURE: 126 MMHG | DIASTOLIC BLOOD PRESSURE: 84 MMHG | TEMPERATURE: 98.4 F

## 2022-05-31 DIAGNOSIS — Z01.810 PRE-OPERATIVE CARDIOVASCULAR EXAMINATION: ICD-10-CM

## 2022-05-31 PROCEDURE — U0003 INFECTIOUS AGENT DETECTION BY NUCLEIC ACID (DNA OR RNA); SEVERE ACUTE RESPIRATORY SYNDROME CORONAVIRUS 2 (SARS-COV-2) (CORONAVIRUS DISEASE [COVID-19]), AMPLIFIED PROBE TECHNIQUE, MAKING USE OF HIGH THROUGHPUT TECHNOLOGIES AS DESCRIBED BY CMS-2020-01-R: HCPCS

## 2022-05-31 PROCEDURE — 99211 OFF/OP EST MAY X REQ PHY/QHP: CPT | Performed by: INTERNAL MEDICINE

## 2022-05-31 PROCEDURE — U0005 INFEC AGEN DETEC AMPLI PROBE: HCPCS

## 2022-05-31 NOTE — PROGRESS NOTES
Performed COVID testing with oral swab for 3 seconds to throat. Per This RN. Dropped swab in labeled collection tube along with  lab order and facesheet with copy of insurance card placed in collection bag. Bag placed in biohazard bag and placed in fridge.  called for . Patient was here in office & educated on OSVALDO for Dx: Murmur CAYLA.  Procedure is scheduled for 6/2/22 @ 8:00, w/arrival @ 6:30, @ [facility]. Procedure and risks were explained to patient. Consent forms were signed. Instructions were given to patient to remain NPO after midnight the night before procedure. Patient may take morning meds the morning of procedure with small amount of water. Patient is asked to call hospital @ [phone], 1 to 2 days before procedure to pre-register. Patient was notified that procedure could be delayed due to an emergency. Patient voiced understanding.  Copies of consent, pre-testing orders, & information sheet scanned into media

## 2022-06-01 ENCOUNTER — TELEPHONE (OUTPATIENT)
Dept: CARDIOLOGY CLINIC | Age: 52
End: 2022-06-01

## 2022-06-01 LAB
SARS-COV-2: NOT DETECTED
SOURCE: NORMAL

## 2022-06-01 NOTE — TELEPHONE ENCOUNTER
Reminder call. Left message on voicemail. Instructions reviewed.  Reminded of F/U appointment 7/14/22 @ 2:00

## 2022-06-02 ENCOUNTER — TELEPHONE (OUTPATIENT)
Dept: CARDIOLOGY CLINIC | Age: 52
End: 2022-06-02

## 2022-06-06 DIAGNOSIS — I50.30 DIASTOLIC CONGESTIVE HEART FAILURE, UNSPECIFIED HF CHRONICITY (HCC): ICD-10-CM

## 2022-06-06 RX ORDER — POTASSIUM CHLORIDE 20 MEQ/1
20 TABLET, EXTENDED RELEASE ORAL 2 TIMES DAILY
Qty: 180 TABLET | Refills: 1 | Status: SHIPPED | OUTPATIENT
Start: 2022-06-06

## 2022-06-06 RX ORDER — FUROSEMIDE 20 MG/1
20 TABLET ORAL 2 TIMES DAILY
Qty: 180 TABLET | Refills: 1 | Status: SHIPPED | OUTPATIENT
Start: 2022-06-06

## 2022-06-13 ENCOUNTER — TELEPHONE (OUTPATIENT)
Dept: CARDIOLOGY CLINIC | Age: 52
End: 2022-06-13

## 2022-06-13 NOTE — TELEPHONE ENCOUNTER
Reminder call. Instructions reviewed. Patient acknowledged understanding.  Reminded of F/U appointment 7/14/22 @ 2:00

## 2022-06-14 ENCOUNTER — HOSPITAL ENCOUNTER (OUTPATIENT)
Dept: NON INVASIVE DIAGNOSTICS | Age: 52
Discharge: HOME | End: 2022-06-14
Payer: COMMERCIAL

## 2022-06-14 VITALS
SYSTOLIC BLOOD PRESSURE: 134 MMHG | RESPIRATION RATE: 20 BRPM | OXYGEN SATURATION: 95 % | DIASTOLIC BLOOD PRESSURE: 86 MMHG | HEART RATE: 75 BPM

## 2022-06-14 PROCEDURE — 7100000001 HC PACU RECOVERY - ADDTL 15 MIN

## 2022-06-14 PROCEDURE — 7100000000 HC PACU RECOVERY - FIRST 15 MIN

## 2022-06-14 PROCEDURE — 93312 ECHO TRANSESOPHAGEAL: CPT

## 2022-06-15 PROCEDURE — 93325 DOPPLER ECHO COLOR FLOW MAPG: CPT | Performed by: INTERNAL MEDICINE

## 2022-06-15 PROCEDURE — 93320 DOPPLER ECHO COMPLETE: CPT | Performed by: INTERNAL MEDICINE

## 2022-06-15 PROCEDURE — 93312 ECHO TRANSESOPHAGEAL: CPT | Performed by: INTERNAL MEDICINE

## 2022-07-14 ENCOUNTER — OFFICE VISIT (OUTPATIENT)
Dept: CARDIOLOGY CLINIC | Age: 52
End: 2022-07-14
Payer: COMMERCIAL

## 2022-07-14 VITALS
OXYGEN SATURATION: 99 % | DIASTOLIC BLOOD PRESSURE: 86 MMHG | BODY MASS INDEX: 39.53 KG/M2 | HEART RATE: 99 BPM | SYSTOLIC BLOOD PRESSURE: 128 MMHG | HEIGHT: 62 IN | WEIGHT: 214.8 LBS

## 2022-07-14 DIAGNOSIS — M79.89 LEG SWELLING: Primary | ICD-10-CM

## 2022-07-14 PROCEDURE — 1036F TOBACCO NON-USER: CPT | Performed by: INTERNAL MEDICINE

## 2022-07-14 PROCEDURE — 99214 OFFICE O/P EST MOD 30 MIN: CPT | Performed by: INTERNAL MEDICINE

## 2022-07-14 PROCEDURE — G8427 DOCREV CUR MEDS BY ELIG CLIN: HCPCS | Performed by: INTERNAL MEDICINE

## 2022-07-14 PROCEDURE — G8417 CALC BMI ABV UP PARAM F/U: HCPCS | Performed by: INTERNAL MEDICINE

## 2022-07-14 PROCEDURE — 3017F COLORECTAL CA SCREEN DOC REV: CPT | Performed by: INTERNAL MEDICINE

## 2022-07-14 NOTE — PATIENT INSTRUCTIONS
Please be informed that if you contact our office outside of normal business hours the physician on call cannot help with any scheduling or rescheduling issues, procedure instruction questions or any type of medication issue. We advise you for any urgent/emergency that you go to the nearest emergency room! PLEASE CALL OUR OFFICE DURING NORMAL BUSINESS HOURS    Monday - Friday   8 am to 5 pm    [place]: [place] [address]: [phone]    [place]:  [phone]    **It is YOUR responsibilty to bring medication bottles and/or updated medication list to [address] [place] [address].  This will allow us to better serve you and all your healthcare needs**

## 2022-07-14 NOTE — PROGRESS NOTES
Mary Lopez MD        OFFICE  FOLLOWUP NOTE    Chief complaints: patient is here for management of diastolic CHF, pulmonary HTN, obesity,migraine,depression    F/u after OSVALDO, NO CAYLA OR HOCM NOTED    Subjective: patient feels leg swelling, no chest pain, no shortness of breath, no dizziness, no palpitations    HPI Mer Coello is a 46 y. o.year old who  has a past medical history of Anemia, Anesthesia, Arthritis, Blood pressure elevated without history of HTN, Blood transfusion, Cardiomegaly, Chronic sinusitis, Depression, Environmental allergies, Family history of early CAD, H/O cardiovascular stress test, H/O echocardiogram, H/O echocardiogram, H/O gastric bypass, H/O hypoglycemia, History of blood transfusion, History of cardiac cath, History of cardiac cath, History of nuclear stress test, Hx of Doppler echocardiogram, HX OTHER MEDICAL, Insomnia, Migraine headache, Obesity, Other bone involvement in diseases classified elsewhere, Presence of IVC filter, Prolonged emergence from general anesthesia, and Seasonal allergies. and presents for management of diastolic CHF, pulmonary HTN, obesity,migraine,depression which are well controlled      Current Outpatient Medications   Medication Sig Dispense Refill    furosemide (LASIX) 20 MG tablet Take 1 tablet by mouth 2 times daily 180 tablet 1    potassium chloride (KLOR-CON M) 20 MEQ extended release tablet Take 1 tablet by mouth 2 times daily 180 tablet 1    ondansetron (ZOFRAN) 4 MG tablet       Multiple Vitamins-Minerals (MULTI COMPLETE PO) Take by mouth      cetirizine (ZYRTEC) 10 MG tablet Take 10 mg by mouth daily.  sertraline (ZOLOFT) 50 MG tablet Take 50 mg by mouth daily.  omeprazole (PRILOSEC) 40 MG capsule Take 40 mg by mouth daily. No current facility-administered medications for this visit.      Allergies: Tape [adhesive tape] and Ibuprofen  Past Medical History:   Diagnosis Date    Anemia     Anesthesia     Trouble waking up.  Arthritis     right knee-S/p Orthovisc injections X3    Blood pressure elevated without history of HTN 2013    per [address] visit- 124/90, 124/92    Blood transfusion Last 2011    No reaction    Cardiomegaly 10/13/2012    per CT chest-in Epic    Chronic sinusitis     Depression     Environmental allergies     Family history of early CAD     Sister- CABG- age 39; Father- Massive MI- age 43    H/O cardiovascular stress test 06/10/2005     EF 69% Normal study.  H/O echocardiogram ; EF55% mild-mod MR,TR, mod pulm htn / EF 52%,Mild MR and TR    H/O echocardiogram 2018    EF 20-06%  Grade 1 diastolic dysfunction, sclerotic, but non-stenotic aortic valve, Mild MR, Mild-Mod TR, Mild-Mod PHTN, no pericardial effusion    H/O gastric bypass     H/O hypoglycemia     History of blood transfusion     History of cardiac cath     No signif.CAD.  History of cardiac cath 2018    normal coronaries    History of nuclear stress test 08/10/2018    Hx of Doppler echocardiogram 2022    EF 90-66% Grade I diastolic dysfunction. Mild to moderate septal wall asymmetrical LV hypertrophy. Sclerotic, but non-stenotic aortic valve. Elevated LVOT velocities. Mild to mod pulmonary htn.  HX OTHER MEDICAL 2013    EVENT MONITOR: -Sinus rhythm, sinus bradycardia. 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     Prolonged emergence from general anesthesia     Seasonal allergies      Past Surgical History:   Procedure Laterality Date     SECTION  1996    x 1    CHOLECYSTECTOMY  1996    Lap Choley    COLONOSCOPY      DIAGNOSTIC CARDIAC CATH LAB PROCEDURE  8/15/05    2005, No significant CAD    ENDOSCOPY, COLON, DIAGNOSTIC 2014    ENDOSCOPY, COLON, DIAGNOSTIC  10/18/2018    Hiatal hernia    GASTRIC BYPASS SURGERY  2006    pre-wt 341#lb    HYSTERECTOMY (CERVIX STATUS UNKNOWN)  8/15/14    LAVH with Right S and O    JOINT REPLACEMENT Right 2016    KNEE ARTHROSCOPY Right 09/17/14    medial/lateral menisectomy, chondroplasty    KNEE SURGERY  2006    right - centered knee cap and scrapes bone spurs and arthritis.  NASAL SEPTUM SURGERY      NM ESOPHAGOGASTRODUODENOSCOPY TRANSORAL DIAGNOSTIC N/A 10/18/2018    EGD DIAGNOSTIC ONLY / POSSIBLE BALLOON performed by Jim Medina MD [address]  2008    Left - fractured x 2     Family History   Problem Relation Age of Onset    Heart Disease Mother     Diabetes Mother     Cancer Mother         lung    Heart Disease Father         Massive MI    Early Death Father 43        Massive MI    Other Sister         pulmonary HTN, O2 ATC    Diabetes Sister     Heart Disease Sister 39        CABG at age 43    Cancer Brother 43        stomach - remission    Heart Disease Brother         MI, CABG at age 45   [place] High Cholesterol Brother     High Blood Pressure Brother     Pacemaker Brother         defibulator    Diabetes Daughter     High Blood Pressure Daughter     Other Daughter         Bladder reflux     Social History     Tobacco Use    Smoking status: Never Smoker    Smokeless tobacco: Never Used    Tobacco comment: Exposed to second hand smoke only.    Substance Use Topics    Alcohol use: Yes     Comment: social      @EMA@  Review of Systems:   · Constitutional: No Fever or Weight Loss   · Eyes: No Decreased Vision  · ENT: No Headaches, Hearing Loss or Vertigo  · Cardiovascular: No chest pain, dyspnea on exertion, palpitations or loss of consciousness  · Respiratory: No cough or wheezing    · Gastrointestinal: No abdominal pain, appetite loss, blood in stools, constipation, diarrhea or heartburn  · Genitourinary: No dysuria, trouble voiding, or hematuria  · Musculoskeletal:  No gait disturbance, weakness or joint complaints  · Integumentary: No rash or pruritis  · Neurological: No TIA or stroke symptoms  · Psychiatric: No anxiety or depression  · Endocrine: No malaise, fatigue or temperature intolerance  · Hematologic/Lymphatic: No bleeding problems, blood clots or swollen lymph nodes  · Allergic/Immunologic: No nasal congestion or hives  All systems negative except as marked. Objective:  /86 (Site: Left Upper Arm, Position: Sitting, Cuff Size: Large Adult)   Pulse 99   Ht 5' 2\" (1.575 m)   Wt 214 lb 12.8 oz (97.4 kg)   LMP 06/04/2012   SpO2 99%   BMI 39.29 kg/m²   Wt Readings from Last 3 Encounters:   07/14/22 214 lb 12.8 oz (97.4 kg)   05/19/22 220 lb 6.4 oz (100 kg)   01/03/22 236 lb (107 kg)     Body mass index is 39.29 kg/m². GENERAL - Alert, oriented, pleasant, in no apparent distress,normal grooming  HEENT - pupils are intact, cornea intact, conjunctive normal color, ears are normal in exam,  Neck - Supple. No jugular venous distention noted. No carotid bruits, no apical -carotid delay  Respiratory:  Normal breath sounds, No respiratory distress, No wheezing, No chest tenderness. ,no use of accessory muscles, diaphragm movement is normal  Cardiovascular: (PMI) apex non displaced,no lifts no thrills, no s3,no s4, Normal heart rate, Normal rhythm, No murmurs, No rubs, No gallops. Carotid arteries pulse and amplitude are normal no bruit, no abdominal bruit noted ( normal abdominal aorta ausculation),   Extremities - No cyanosis, clubbing, or significant edema, no varicose veins    Abdomen - No masses, tenderness, or organomegaly, no hepato-splenomegally, no bruits  Musculoskeletal +edema, no kyphosis or scoliosis, no deformity in any extremity noted, muscle strength and tone are normal  Skin: no ulcer,no scar,no stasis dermatitis   Neurologic - alert oriented times 3,Cranial nerves II through XII are grossly intact.   There were no gross focal neurologic abnormalities. Psychiatric: normal mood and affect    Lab Results   Component Value Date/Time    TROPONINI 0.108 03/13/2014 03:56 AM    TROPONINI 0.008 06/27/2011 10:39 PM     BNP:  No results found for: BNP  PT/INR:  No results found for: PTINR  No results found for: LABA1C  Lab Results   Component Value Date    CHOL 216 11/08/2013    TRIG 95 11/08/2013    HDL 63 11/08/2013    LDLCALC 134 11/08/2013    LDLDIRECT 120 (H) 04/23/2012     Lab Results   Component Value Date    ALT 35 11/18/2020    AST 68 (H) 11/18/2020     TSH:  No results found for: TSH    Impression:  Amisha Thakur is a 46 y. o.year old who  has a past medical history of Anemia, Anesthesia, Arthritis, Blood pressure elevated without history of HTN, Blood transfusion, Cardiomegaly, Chronic sinusitis, Depression, Environmental allergies, Family history of early CAD, H/O cardiovascular stress test, H/O echocardiogram, H/O echocardiogram, H/O gastric bypass, H/O hypoglycemia, History of blood transfusion, History of cardiac cath, History of cardiac cath, History of nuclear stress test, Hx of Doppler echocardiogram, HX OTHER MEDICAL, Insomnia, Migraine headache, Obesity, Other bone involvement in diseases classified elsewhere, Presence of IVC filter, Prolonged emergence from general anesthesia, and Seasonal allergies. and presents with     Plan:  1. Murrmur: elevated LVOT velocity, no stenosis, OSVALDO ruled out possible CAYLA  2. Shingles: resolved 2 yrs ago, recommend to get vaccine  3. Leg swelling: venous doppler ordered  4. Stress and anxiety: better, she is remarried, happy now  5. Diastolic CHF and pulmonary HTN: refill  lasix and refill low dose KCL. 6. NIGHT SWEATS: recommend to get holter monitor, she has night mare also, she is not interested for now. 7. Leg swelling: from diastolic CHF  8. Obesity: s.p gastric bypass'lost > 40 lbs recently  9. Migraine: stable, off medications  10.  Depression:stable, continue zolofHealth maintenance: exerise and diet  All labs, medications and tests reviewed, continue all other medications of all above medical condition listed as is.     @[email or web address]@

## 2022-07-20 ENCOUNTER — TELEPHONE (OUTPATIENT)
Dept: CARDIOLOGY CLINIC | Age: 52
End: 2022-07-20

## 2022-07-20 NOTE — TELEPHONE ENCOUNTER
Cardiologist: Dr. Emanuel Hernandez  Surgeon: Dr. Arley Martinez  Surgery: Phacoemulsification w/Intraocular lens implant  Anesthesia: Topical w/IV sedation  Date: 8/15/2022  FAX# [phone]  # [phone]    Last OV 7/14/2022 w/Claude    Plan:  Murrmur: elevated LVOT velocity, no stenosis, OSVALDO ruled out possible CAYLA  Shingles: resolved 2 yrs ago, recommend to get vaccine  Leg swelling: venous doppler ordered  Stress and anxiety: better, she is remarried, happy now  Diastolic CHF and pulmonary HTN: refill  lasix and refill low dose KCL. NIGHT SWEATS: recommend to get holter monitor, she has night mare also, she is not interested for now. Leg swelling: from diastolic CHF  Obesity: s.p gastric bypass'lost > 40 lbs recently  Migraine: stable, off medications  Depression:stable, continue zolofHealth maintenance: exerise and diet      Last EKG- 5/19/2022      NM- 8/9/2018  abnormal stress test, normal LVEF,    Myocardial perfusion scan shows moderate size, moderate intensity, partially    reversible perfusion defect in anterior wall noted. Breast tissue attenuation    artifact cannot be excluded. Echo- 1/20/2022   Left ventricular systolic function is normal with an ejection fraction of   55-60%. Grade I diastolic dysfunction. Mild to moderate septal wall asymmetrical left ventricular hypertrophy. Sclerotic, but non-stenotic aortic valve. Elevated LVOT velocities. Right ventricular systolic pressure of 48 mmHg consistent with mild to   moderate pulmonary hypertension. No evidence of pericardial effusion.       Cath- 8/30/2018   normal coronaries    moderate pulmonary HTN    MILDLY ELEVATED PCWP

## 2022-08-05 ENCOUNTER — PROCEDURE VISIT (OUTPATIENT)
Dept: CARDIOLOGY CLINIC | Age: 52
End: 2022-08-05
Payer: COMMERCIAL

## 2022-08-05 DIAGNOSIS — M79.89 LEG SWELLING: ICD-10-CM

## 2022-08-05 PROCEDURE — 93970 EXTREMITY STUDY: CPT | Performed by: INTERNAL MEDICINE

## 2022-08-10 ENCOUNTER — TELEPHONE (OUTPATIENT)
Dept: CARDIOLOGY CLINIC | Age: 52
End: 2022-08-10

## 2022-08-10 NOTE — TELEPHONE ENCOUNTER
Significant reflux noted of the Right CFV (2.3s). No significant reflux noted in the veins of the left lower extremity. No evidence of DVT or SVT in the bilateral common femoral vein, femoral    vein, popliteal vein, greater saphenous vein or small saphenous vein. Recommendations        compression socks recommended     Spoke with patient regarding results of lower extremity doppler. Patient voiced understanding.

## 2022-10-07 ENCOUNTER — OFFICE VISIT (OUTPATIENT)
Dept: CARDIOLOGY CLINIC | Age: 52
End: 2022-10-07
Payer: COMMERCIAL

## 2022-10-07 VITALS
HEIGHT: 62 IN | BODY MASS INDEX: 38.87 KG/M2 | SYSTOLIC BLOOD PRESSURE: 118 MMHG | WEIGHT: 211.2 LBS | DIASTOLIC BLOOD PRESSURE: 82 MMHG | HEART RATE: 77 BPM | OXYGEN SATURATION: 96 %

## 2022-10-07 DIAGNOSIS — M79.89 LEG SWELLING: Primary | ICD-10-CM

## 2022-10-07 PROCEDURE — 3017F COLORECTAL CA SCREEN DOC REV: CPT | Performed by: INTERNAL MEDICINE

## 2022-10-07 PROCEDURE — G8417 CALC BMI ABV UP PARAM F/U: HCPCS | Performed by: INTERNAL MEDICINE

## 2022-10-07 PROCEDURE — G8484 FLU IMMUNIZE NO ADMIN: HCPCS | Performed by: INTERNAL MEDICINE

## 2022-10-07 PROCEDURE — 99214 OFFICE O/P EST MOD 30 MIN: CPT | Performed by: INTERNAL MEDICINE

## 2022-10-07 PROCEDURE — G8427 DOCREV CUR MEDS BY ELIG CLIN: HCPCS | Performed by: INTERNAL MEDICINE

## 2022-10-07 PROCEDURE — 1036F TOBACCO NON-USER: CPT | Performed by: INTERNAL MEDICINE

## 2022-10-07 RX ORDER — DULOXETIN HYDROCHLORIDE 30 MG/1
CAPSULE, DELAYED RELEASE ORAL
COMMUNITY
Start: 2022-09-08

## 2022-10-07 NOTE — PROGRESS NOTES
Denzel Conley MD        OFFICE  FOLLOWUP NOTE    Chief complaints: patient is here for management of diastolic CHF, pulmonary HTN, obesity,migraine,depression, h/o gastric bypass 2006    Subjective: patient feels better, no chest pain, no shortness of breath, no dizziness, no palpitations    HPI Hazle Lighter is a 46 y. o.year old who  has a past medical history of Anemia, Anesthesia, Arthritis, Blood pressure elevated without history of HTN, Blood transfusion, Cardiomegaly, Chronic sinusitis, Depression, Environmental allergies, Family history of early CAD, H/O cardiovascular stress test, H/O echocardiogram, H/O echocardiogram, H/O gastric bypass, H/O hypoglycemia, History of blood transfusion, History of cardiac cath, History of cardiac cath, History of nuclear stress test, Hx of Doppler echocardiogram, Hx of Doppler ultrasound, HX OTHER MEDICAL, Insomnia, Migraine headache, Obesity, Other bone involvement in diseases classified elsewhere, Presence of IVC filter, Prolonged emergence from general anesthesia, and Seasonal allergies. and presents for management of diastolic CHF, pulmonary HTN, obesity,migraine,depression, h/o gastric bypass 2006  which are well controlled      Current Outpatient Medications   Medication Sig Dispense Refill    furosemide (LASIX) 20 MG tablet Take 1 tablet by mouth 2 times daily 180 tablet 1    potassium chloride (KLOR-CON M) 20 MEQ extended release tablet Take 1 tablet by mouth 2 times daily 180 tablet 1    ondansetron (ZOFRAN) 4 MG tablet       Multiple Vitamins-Minerals (MULTI COMPLETE PO) Take by mouth      cetirizine (ZYRTEC) 10 MG tablet Take 10 mg by mouth daily. omeprazole (PRILOSEC) 40 MG capsule Take 40 mg by mouth daily. DULoxetine (CYMBALTA) 30 MG extended release capsule TAKE 1 CAPSULE BY MOUTH EVERY DAY       No current facility-administered medications for this visit.      Allergies: Tape [adhesive tape] and Ibuprofen  Past Medical History: Diagnosis Date    Anemia     Anesthesia     Trouble waking up. Arthritis     right knee-S/p Orthovisc injections X3    Blood pressure elevated without history of HTN 2013    per [address] visit- 124/90, 124/92    Blood transfusion Last 2011    No reaction    Cardiomegaly 10/13/2012    per CT chest-in Epic    Chronic sinusitis     Depression     Environmental allergies     Family history of early CAD     Sister- CABG- age 39; Father- Massive MI- age 43    H/O cardiovascular stress test 06/10/2005     EF 69% Normal study. H/O echocardiogram ; EF55% mild-mod MR,TR, mod pulm htn / EF 52%,Mild MR and TR    H/O echocardiogram 2018    EF 10-90%  Grade 1 diastolic dysfunction, sclerotic, but non-stenotic aortic valve, Mild MR, Mild-Mod TR, Mild-Mod PHTN, no pericardial effusion    H/O gastric bypass     H/O hypoglycemia     History of blood transfusion     History of cardiac cath     No signif.CAD. History of cardiac cath 2018    normal coronaries    History of nuclear stress test 08/10/2018    Hx of Doppler echocardiogram 2022    EF 40-44% Grade I diastolic dysfunction. Mild to moderate septal wall asymmetrical LV hypertrophy. Sclerotic, but non-stenotic aortic valve. Elevated LVOT velocities. Mild to mod pulmonary htn. Hx of Doppler ultrasound 2022    Significant reflux noted of the right CFV(2.3). HX OTHER MEDICAL 2013    EVENT MONITOR: -Sinus rhythm, sinus bradycardia. 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     Prolonged emergence from general anesthesia     Seasonal allergies      Past Surgical History:   Procedure Laterality Date     SECTION      x [address] DIAGNOSTIC CARDIAC CATH LAB PROCEDURE  8/15/05    2005, No significant CAD    ENDOSCOPY, COLON, DIAGNOSTIC  2014    ENDOSCOPY, COLON, DIAGNOSTIC  10/18/2018    Hiatal hernia    GASTRIC BYPASS SURGERY  2006    pre-wt 341#lb    HYSTERECTOMY (CERVIX STATUS UNKNOWN)  8/15/14    LAVH with Right S and O    JOINT REPLACEMENT Right 2016    KNEE ARTHROSCOPY Right 09/17/14    medial/lateral menisectomy, chondroplasty    KNEE SURGERY  2006    right - centered knee cap and scrapes bone spurs and arthritis. NASAL SEPTUM SURGERY      [place] ESOPHAGOGASTRODUODENOSCOPY TRANSORAL DIAGNOSTIC N/A 10/18/2018    EGD DIAGNOSTIC ONLY / POSSIBLE BALLOON performed by Jimmy Aguirre MD at [address]  2008    Left - fractured x 2     Family History   Problem Relation Age of Onset    Heart Disease Mother     Diabetes Mother     Cancer Mother         lung    Heart Disease Father         Massive MI    Early Death Father 43        Massive MI    Other Sister         pulmonary HTN, O2 ATC    Diabetes Sister     Heart Disease Sister 39        CABG at age 43    Cancer Brother 43        stomach - remission    Heart Disease Brother         MI, CABG at age 45    High Cholesterol Brother     High Blood Pressure Brother     Pacemaker Brother         defibulator    Diabetes Daughter     High Blood Pressure Daughter     Other Daughter         Bladder reflux     Social History     Tobacco Use    Smoking status: Never    Smokeless tobacco: Never    Tobacco comments:     Exposed to second hand smoke only.    Substance Use Topics    Alcohol use: Yes     Comment: social    caffeine  2-3 daily      @EMA@  Review of Systems:   Constitutional: No Fever or Weight Loss   Eyes: No Decreased Vision  ENT: No Headaches, Hearing Loss or Vertigo  Cardiovascular: No chest pain, dyspnea on exertion, palpitations or loss of consciousness  Respiratory: No cough or wheezing    Gastrointestinal: No abdominal pain, appetite loss, blood in stools, constipation, diarrhea or heartburn  Genitourinary: No dysuria, trouble voiding, or hematuria  Musculoskeletal:  No gait disturbance, weakness or joint complaints  Integumentary: No rash or pruritis  Neurological: No TIA or stroke symptoms  Psychiatric: No anxiety or depression  Endocrine: No malaise, fatigue or temperature intolerance  Hematologic/Lymphatic: No bleeding problems, blood clots or swollen lymph nodes  Allergic/Immunologic: No nasal congestion or hives  All systems negative except as marked. Objective:  /82 (Site: Left Upper Arm, Position: Sitting, Cuff Size: Large Adult)   Pulse 77   Ht 5' 2\" (1.575 m)   Wt 211 lb 3.2 oz (95.8 kg)   LMP 06/04/2012   SpO2 96%   BMI 38.63 kg/m²   Wt Readings from Last 3 Encounters:   10/07/22 211 lb 3.2 oz (95.8 kg)   07/14/22 214 lb 12.8 oz (97.4 kg)   05/19/22 220 lb 6.4 oz (100 kg)     Body mass index is 38.63 kg/m². GENERAL - Alert, oriented, pleasant, in no apparent distress,normal grooming  HEENT - pupils are intact, cornea intact, conjunctive normal color, ears are normal in exam,  Neck - Supple. No jugular venous distention noted. No carotid bruits, no apical -carotid delay  Respiratory:  Normal breath sounds, No respiratory distress, No wheezing, No chest tenderness. ,no use of accessory muscles, diaphragm movement is normal  Cardiovascular: (PMI) apex non displaced,no lifts no thrills, no s3,no s4, Normal heart rate, Normal rhythm, + murmurs, No rubs, No gallops.  Carotid arteries pulse and amplitude are normal no bruit, no abdominal bruit noted ( normal abdominal aorta ausculation),   Extremities - No cyanosis, clubbing, or significant edema, no varicose veins    Abdomen - No masses, tenderness, or organomegaly, no hepato-splenomegally, no bruits  Musculoskeletal  rt leg edema, no kyphosis or scoliosis, no deformity in any extremity noted, muscle strength and tone are normal  Skin: no ulcer,no scar,no stasis dermatitis   Neurologic - alert oriented times 3,Cranial nerves II through XII are grossly intact. There were no gross focal neurologic abnormalities. Psychiatric: normal mood and affect    Lab Results   Component Value Date/Time    TROPONINI 0.108 03/13/2014 03:56 AM    TROPONINI 0.008 06/27/2011 10:39 PM     BNP:  No results found for: BNP  PT/INR:  No results found for: PTINR  No results found for: LABA1C  Lab Results   Component Value Date    CHOL 216 11/08/2013    TRIG 95 11/08/2013    HDL 63 11/08/2013    LDLCALC 134 11/08/2013    LDLDIRECT 120 (H) 04/23/2012     Lab Results   Component Value Date    ALT 35 11/18/2020    AST 68 (H) 11/18/2020     TSH:  No results found for: TSH    Impression:  Laurie Mott is a 46 y. o.year old who  has a past medical history of Anemia, Anesthesia, Arthritis, Blood pressure elevated without history of HTN, Blood transfusion, Cardiomegaly, Chronic sinusitis, Depression, Environmental allergies, Family history of early CAD, H/O cardiovascular stress test, H/O echocardiogram, H/O echocardiogram, H/O gastric bypass, H/O hypoglycemia, History of blood transfusion, History of cardiac cath, History of cardiac cath, History of nuclear stress test, Hx of Doppler echocardiogram, Hx of Doppler ultrasound, HX OTHER MEDICAL, Insomnia, Migraine headache, Obesity, Other bone involvement in diseases classified elsewhere, Presence of IVC filter, Prolonged emergence from general anesthesia, and Seasonal allergies.  and presents with     Plan:  [place]: elevated LVOT velocity, no stenosis, OSVALDO ruled out possible CAYLA  Gastric bypass sx: she will possibly get skin resection sx  Shingles: resolved 2 yrs ago, recommend to get vaccine  Leg swelling:  R > L, HAS SEVERE reflux in rt common femoral vein, contineu compression socks,.amazon website visit with patient, recommend to use compression wraps and automatic leg compression sleeve  Stress and anxiety: better, she is remarried, happy now  Diastolic CHF and pulmonary HTN: refill lasix and refill low dose KCL. NIGHT SWEATS: recommend to get holter monitor, she has night mare also, she is not interested for now. Leg swelling: from diastolic CHF  Obesity: s.p gastric bypass'lost > 40 lbs recently  Migraine: stable, off medications  Depression:stable, continue [facility] maintenance: exerise and diet  All labs, medications and tests reviewed, continue all other medications of all above medical condition listed as is.     @[facility]@

## 2022-11-16 DIAGNOSIS — I50.30 DIASTOLIC CONGESTIVE HEART FAILURE, UNSPECIFIED HF CHRONICITY (HCC): ICD-10-CM

## 2022-11-16 RX ORDER — FUROSEMIDE 20 MG/1
20 TABLET ORAL 2 TIMES DAILY
Qty: 180 TABLET | Refills: 1 | Status: SHIPPED | OUTPATIENT
Start: 2022-11-16

## 2022-11-16 RX ORDER — POTASSIUM CHLORIDE 20 MEQ/1
20 TABLET, EXTENDED RELEASE ORAL 2 TIMES DAILY
Qty: 180 TABLET | Refills: 1 | Status: SHIPPED | OUTPATIENT
Start: 2022-11-16

## 2023-01-24 ENCOUNTER — OFFICE VISIT (OUTPATIENT)
Dept: CARDIOLOGY CLINIC | Age: 53
End: 2023-01-24
Payer: COMMERCIAL

## 2023-01-24 VITALS
SYSTOLIC BLOOD PRESSURE: 110 MMHG | WEIGHT: 203 LBS | BODY MASS INDEX: 37.36 KG/M2 | HEART RATE: 94 BPM | HEIGHT: 62 IN | DIASTOLIC BLOOD PRESSURE: 70 MMHG

## 2023-01-24 DIAGNOSIS — E78.5 HYPERLIPIDEMIA, UNSPECIFIED HYPERLIPIDEMIA TYPE: ICD-10-CM

## 2023-01-24 DIAGNOSIS — I87.2 VENOUS REFLUX: Primary | ICD-10-CM

## 2023-01-24 DIAGNOSIS — R06.02 SOB (SHORTNESS OF BREATH): ICD-10-CM

## 2023-01-24 DIAGNOSIS — I27.20 PULMONARY HTN (HCC): ICD-10-CM

## 2023-01-24 PROCEDURE — 93000 ELECTROCARDIOGRAM COMPLETE: CPT | Performed by: NURSE PRACTITIONER

## 2023-01-24 PROCEDURE — 99214 OFFICE O/P EST MOD 30 MIN: CPT | Performed by: NURSE PRACTITIONER

## 2023-01-24 PROCEDURE — 1036F TOBACCO NON-USER: CPT | Performed by: NURSE PRACTITIONER

## 2023-01-24 PROCEDURE — G8484 FLU IMMUNIZE NO ADMIN: HCPCS | Performed by: NURSE PRACTITIONER

## 2023-01-24 PROCEDURE — G8417 CALC BMI ABV UP PARAM F/U: HCPCS | Performed by: NURSE PRACTITIONER

## 2023-01-24 PROCEDURE — 3017F COLORECTAL CA SCREEN DOC REV: CPT | Performed by: NURSE PRACTITIONER

## 2023-01-24 PROCEDURE — G8427 DOCREV CUR MEDS BY ELIG CLIN: HCPCS | Performed by: NURSE PRACTITIONER

## 2023-01-24 RX ORDER — SERTRALINE HYDROCHLORIDE 100 MG/1
100 TABLET, FILM COATED ORAL DAILY
COMMUNITY

## 2023-01-24 RX ORDER — DICYCLOMINE HCL 20 MG
20 TABLET ORAL EVERY 6 HOURS
COMMUNITY

## 2023-01-24 RX ORDER — SIMVASTATIN 5 MG
5 TABLET ORAL NIGHTLY
COMMUNITY

## 2023-01-24 ASSESSMENT — ENCOUNTER SYMPTOMS: SHORTNESS OF BREATH: 1

## 2023-01-24 NOTE — PATIENT INSTRUCTIONS
**It is YOUR responsibilty to bring medication bottles and/or updated medication list to [address] [place] [address]. This will allow us to better serve you and all your healthcare needs**    [facility] Locations - No appointment necessary. Sites open Monday to Friday. Call your preferred location for test preparation, business   hours and other information you need. [facility] accepts [employer]. [address] [facility], [address] [facility]  Phone: [phone] [place]  [address]  [address] [place], [address]  Phone: [phone]       Please be informed that if you contact our office outside of normal business hours the physician on call cannot help with any scheduling or rescheduling issues, procedure instruction questions or any type of medication issue. We advise you for any urgent/emergency that you go to the nearest emergency room! PLEASE CALL OUR OFFICE DURING NORMAL BUSINESS HOURS    Monday - Friday   8 am to 5 pm    [place]: [phone]    [place]:  [phone]    Thank you for allowing us to care for you today! We want to ensure we can follow your treatment plan and we strive to give you the best outcomes and experience possible. If you ever have a life threatening emergency and call 911 - for an ambulance (EMS)   Our providers can only care for you at:   [facility] or [facility]. Even if you have someone take you or you drive yourself we can only care for you in a [facility] facility. Our providers are not setup at the other healthcare locations!

## 2023-01-24 NOTE — ASSESSMENT & PLAN NOTE
- Moderate pulmonary hypertension seen on [facility] in 2018. RVSP 48 mmHg on echocardiogram 1/20/22.

## 2023-01-24 NOTE — PROGRESS NOTES
[facility] PHYSICAL REHABILITATION [facility] [address] [facility] [facility] [address],Third [address]  Phone: [phone]    Fax [phone]                  Ada Curran MD, Chalino Duarte MD, Idris Ly MD, MD Blaine Umanzor MD, Shira Tuttle MD, Deland Eisenmenger, MD, [address] [facility], [facility]       [facility]  [facility]        Cardiology Progress Note      1/24/2023    RE: Curtis Cervantes  (1970)                             Primary cardiologist: Dr. Blaine Thomas       Subjective:  CC:   1. Venous reflux    2. Pulmonary HTN ([address]    3. Hyperlipidemia, unspecified hyperlipidemia type    4. SOB (shortness of breath)        HPI: Curtis Cervantes, who is a  48y.o. year old female with a past medical history as listed below. Patient presents to the office for follow up on pulmonary HTN, venous reflux, chest pain, and hyperlipidemia. Patient recently admitted to [facility] with complaints of chest pain and SOB since December. She was found to be anemic and hgb was 7.9. She has a history of anemia and has not been hospitalized for many years. She had stress test that was normal.  Patient denies any  dizziness, syncope, or palpitations. Past Medical History:   Diagnosis Date    Anemia     Anesthesia     Trouble waking up. Arthritis     right knee-S/p Orthovisc injections X3    Blood pressure elevated without history of HTN 11/14/2013    per [address] [place] [address] Road visit- 124/90, 124/92    Blood transfusion Last 6/2011    No reaction    Cardiomegaly 10/13/2012    per CT chest-in Epic    Chronic sinusitis     Depression     Environmental allergies     Family history of early CAD     Sister- CABG- age 39; Father- Massive MI- age 43    H/O cardiovascular stress test 06/10/2005     EF 69% Normal study.     H/O echocardiogram 11/13;5/05 11/13 EF55% mild-mod MR,TR, mod pulm htn 2005/ EF 52%,Mild MR and TR H/O echocardiogram 08/02/2018    EF 53-81%  Grade 1 diastolic dysfunction, sclerotic, but non-stenotic aortic valve, Mild MR, Mild-Mod TR, Mild-Mod PHTN, no pericardial effusion    H/O gastric bypass     H/O hypoglycemia     History of blood transfusion     History of cardiac cath 2005    No signif.CAD. History of cardiac cath 08/30/2018    normal coronaries    History of nuclear stress test 08/10/2018    Hx of Doppler echocardiogram 01/20/2022    EF 52-65% Grade I diastolic dysfunction. Mild to moderate septal wall asymmetrical LV hypertrophy. Sclerotic, but non-stenotic aortic valve. Elevated LVOT velocities. Mild to mod pulmonary htn. Hx of Doppler ultrasound 08/05/2022    Significant reflux noted of the right CFV(2.3). HX OTHER MEDICAL 12/30/2013    EVENT MONITOR: 12/13-Sinus rhythm, sinus bradycardia. 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 Prolonged emergence from general anesthesia     Seasonal allergies        Current Outpatient Medications   Medication Sig Dispense Refill    dicyclomine (BENTYL) 20 MG tablet Take 20 mg by mouth in the morning and 20 mg at noon and 20 mg in the evening and 20 mg before bedtime. sertraline (ZOLOFT) 100 MG tablet Take 100 mg by mouth daily      simvastatin (ZOCOR) 5 MG tablet Take 5 mg by mouth nightly      furosemide (LASIX) 20 MG tablet Take 1 tablet by mouth 2 times daily 180 tablet 1    potassium chloride (KLOR-CON M) 20 MEQ extended release tablet Take 1 tablet by mouth 2 times daily 180 tablet 1    DULoxetine (CYMBALTA) 30 MG extended release capsule TAKE 1 CAPSULE BY MOUTH EVERY DAY      Multiple Vitamins-Minerals (MULTI COMPLETE PO) Take by mouth      cetirizine (ZYRTEC) 10 MG tablet Take 10 mg by mouth daily.       omeprazole (PRILOSEC) 40 MG capsule Take 40 mg by mouth daily. No current facility-administered medications for this visit. Review of Systems:  Review of Systems   Respiratory:  Positive for shortness of breath. Cardiovascular:  Negative for chest pain, palpitations and leg swelling. Musculoskeletal: Negative. Skin: Negative. Neurological:  Negative for dizziness and weakness. All other systems reviewed and are negative. Objective:      Physical Exam:  /70 (Site: Left Upper Arm, Position: Sitting, Cuff Size: Medium Adult)   Pulse 94   Ht 5' 2\" (1.575 m)   Wt 203 lb (92.1 kg)   LMP 06/04/2012   BMI 37.13 kg/m²   Wt Readings from Last 3 Encounters:   01/24/23 203 lb (92.1 kg)   10/07/22 211 lb 3.2 oz (95.8 kg)   07/14/22 214 lb 12.8 oz (97.4 kg)     Body mass index is 37.13 kg/m². Physical exam:  Physical Exam  Constitutional:       Appearance: She is well-developed. Cardiovascular:      Rate and Rhythm: Normal rate and regular rhythm. Pulses: Intact distal pulses. Dorsalis pedis pulses are 2+ on the right side and 2+ on the left side. Posterior tibial pulses are 2+ on the right side and 2+ on the left side. Heart sounds: Normal heart sounds, S1 normal and S2 normal.   Pulmonary:      Effort: Pulmonary effort is normal.      Breath sounds: Normal breath sounds. Musculoskeletal:         General: Normal range of motion. Skin:     General: Skin is warm and dry. Neurological:      Mental Status: She is alert and oriented to person, place, and time.         DATA:  Lab Results   Component Value Date/Time    TROPONINI 0.108 03/13/2014 03:56 AM    TROPONINI 0.008 06/27/2011 10:39 PM     BNP:  No results found for: BNP  PT/INR:  No results found for: PTINR  No results found for: LABA1C  Lab Results   Component Value Date    CHOL 216 11/08/2013    TRIG 95 11/08/2013    HDL 63 11/08/2013    LDLCALC 134 11/08/2013    LDLDIRECT 120 (H) 04/23/2012     Lab Results   Component Value Date    ALT 35 11/18/2020    AST 68 (H) 11/18/2020     TSH:  No results found for: TSH    Vitals:    01/24/23 0932   BP: 110/70   Pulse: 94       Echo:1/20/22   Left ventricular systolic function is normal with an ejection fraction of   55-60%. Grade I diastolic dysfunction. Mild to moderate septal wall asymmetrical left ventricular hypertrophy. Sclerotic, but non-stenotic aortic valve. Elevated LVOT velocities. Right ventricular systolic pressure of 48 mmHg consistent with mild to   moderate pulmonary hypertension. No evidence of pericardial effusion. Echo:6/14/22 OSVALDO   There is no evidence of any pericardial effusion. No evidence of thrombus within the left atrial appendage. Negative bubble study; no ASD or PFO noted. Restricted motion of the left coronary cusp of the aortic valve. Sigmoid shaped septum noted; however, no evidence of CAYLA. Peak LVOT   velocity: 114cm/s. No evidence of HOCM. There is no evidence of any pericardial effusion. Stress Test:1/23/23  No ischemia     Cath:8/30/18  normal coronaries    moderate pulmonary HTN    MILDLY ELEVATED PCWP       The ASCVD Risk score (Nyla DK, et al., 2019) failed to calculate for the following reasons:    Cannot find a previous HDL lab    Cannot find a previous total cholesterol lab      Assessment/ Plan:     Venous reflux   - Significant reflux of right CFV. Recommend compression stockings. Continue with Lasix 20 mg twice daily and potassium 20 MEQ twice daily. Pulmonary HTN ([address]   - Moderate pulmonary hypertension seen on [facility] in 2018. RVSP 48 mmHg on echocardiogram 1/20/22. Increased SOB over the last couple months. Will repeat echo. Hyperlipidemia  -Not controlled: , total 238, HDL 48. ASCVD risk 2.0%. Patient started on Zocor 5 mg during hospitalization.     -The nature of cardiac risk has been fully discussed with this patient. I have made her aware of her LDL target goal given her cardiovascular risk analysis. I have discussed the appropriate diet. The need for lifelong compliance in order to reduce risk is stressed. A regular exercise program is recommended to help achieve and maintain normal body weight, fitness and improve lipid balance. A written copy of a low fat, low cholesterol diet has been given to the patient. SOB  -Increased SOB and anemia. Normal stress test, will get echo. Patient seen, interviewed and examined. Testing was reviewed. Patient is encouraged to exercise even a brisk walk for 30 minutes at least 3 to 4 times a week. Lifestyle and risk factor modificatons discussed. Various goals are discussed and questions answered. Continue current medications. Appropriate prescriptions are addressed. Questions answered and patient verbalizes understanding. Call for any problems, questions, or concerns. Pt is to follow up in 1 months for Cardiac management    Electronically signed by Perla Fishman.  BUSHRA Wilkes CNP on 1/24/2023 at 10:18 AM

## 2023-01-24 NOTE — ASSESSMENT & PLAN NOTE
- Significant reflux of right CFV. Recommend compression stockings.   Continue with Lasix 20 mg twice daily and potassium 20 M EQ twice daily

## 2023-01-24 NOTE — ASSESSMENT & PLAN NOTE
-Not controlled: , total 238, HDL 48. ASCVD risk 2.0%    -The nature of cardiac risk has been fully discussed with this patient. I have made her aware of her LDL target goal given her cardiovascular risk analysis. I have discussed the appropriate diet. The need for lifelong compliance in order to reduce risk is stressed. A regular exercise program is recommended to help achieve and maintain normal body weight, fitness and improve lipid balance. A written copy of a low fat, low cholesterol diet has been given to the patient.

## 2023-02-07 ENCOUNTER — PROCEDURE VISIT (OUTPATIENT)
Dept: CARDIOLOGY CLINIC | Age: 53
End: 2023-02-07

## 2023-02-07 DIAGNOSIS — I87.2 VENOUS REFLUX: ICD-10-CM

## 2023-02-07 DIAGNOSIS — R06.02 SOB (SHORTNESS OF BREATH): ICD-10-CM

## 2023-02-07 DIAGNOSIS — E78.5 HYPERLIPIDEMIA, UNSPECIFIED HYPERLIPIDEMIA TYPE: ICD-10-CM

## 2023-02-07 DIAGNOSIS — I27.20 PULMONARY HTN (HCC): ICD-10-CM

## 2023-02-07 LAB
LV EF: 58 %
LVEF MODALITY: NORMAL

## 2023-02-13 ENCOUNTER — TELEPHONE (OUTPATIENT)
Dept: CARDIOLOGY CLINIC | Age: 53
End: 2023-02-13

## 2023-02-13 NOTE — TELEPHONE ENCOUNTER
Called pt for Echo result. Summary   Left ventricular function and size is normal, EF is estimated at 55-60%. Sigmoid shaped septum noted. Mild left ventricular hypertrophy. No regional wall motion abnormalities were detected. Mildly dilated left atrium. Calcified non coronary cusp of the aortic valve. Elevated LVOT velocities noted. Mild mitral , tricuspid and pulmonic regurgitation is present. Mild Pulmonary hypertension with RVSP of 43mmHg. No evidence of pericardial effusion.     Pt voiced understanding and will keep f/u appt

## 2023-07-27 DIAGNOSIS — I50.30 DIASTOLIC CONGESTIVE HEART FAILURE, UNSPECIFIED HF CHRONICITY (HCC): ICD-10-CM

## 2023-07-27 RX ORDER — POTASSIUM CHLORIDE 20 MEQ/1
20 TABLET, EXTENDED RELEASE ORAL 2 TIMES DAILY
Qty: 180 TABLET | Refills: 1 | Status: CANCELLED | OUTPATIENT
Start: 2023-07-27

## 2023-07-27 RX ORDER — SIMVASTATIN 5 MG
5 TABLET ORAL NIGHTLY
Qty: 90 TABLET | Refills: 3 | Status: CANCELLED | OUTPATIENT
Start: 2023-07-27

## 2023-07-27 RX ORDER — FUROSEMIDE 20 MG/1
20 TABLET ORAL 2 TIMES DAILY
Qty: 180 TABLET | Refills: 1 | Status: CANCELLED | OUTPATIENT
Start: 2023-07-27

## 2023-08-01 ENCOUNTER — OFFICE VISIT (OUTPATIENT)
Dept: CARDIOLOGY CLINIC | Age: 53
End: 2023-08-01
Payer: COMMERCIAL

## 2023-08-01 VITALS
HEIGHT: 62 IN | DIASTOLIC BLOOD PRESSURE: 80 MMHG | WEIGHT: 203.6 LBS | BODY MASS INDEX: 37.47 KG/M2 | SYSTOLIC BLOOD PRESSURE: 112 MMHG | HEART RATE: 82 BPM

## 2023-08-01 DIAGNOSIS — I50.30 DIASTOLIC CONGESTIVE HEART FAILURE, UNSPECIFIED HF CHRONICITY (HCC): ICD-10-CM

## 2023-08-01 DIAGNOSIS — E66.09 CLASS 2 OBESITY DUE TO EXCESS CALORIES WITHOUT SERIOUS COMORBIDITY WITH BODY MASS INDEX (BMI) OF 37.0 TO 37.9 IN ADULT: ICD-10-CM

## 2023-08-01 DIAGNOSIS — I27.20 PULMONARY HTN (HCC): ICD-10-CM

## 2023-08-01 DIAGNOSIS — E78.5 HYPERLIPIDEMIA, UNSPECIFIED HYPERLIPIDEMIA TYPE: ICD-10-CM

## 2023-08-01 DIAGNOSIS — I87.2 VENOUS REFLUX: Primary | ICD-10-CM

## 2023-08-01 DIAGNOSIS — R06.02 SOB (SHORTNESS OF BREATH): ICD-10-CM

## 2023-08-01 PROCEDURE — 99214 OFFICE O/P EST MOD 30 MIN: CPT | Performed by: NURSE PRACTITIONER

## 2023-08-01 PROCEDURE — 3017F COLORECTAL CA SCREEN DOC REV: CPT | Performed by: NURSE PRACTITIONER

## 2023-08-01 PROCEDURE — G8427 DOCREV CUR MEDS BY ELIG CLIN: HCPCS | Performed by: NURSE PRACTITIONER

## 2023-08-01 PROCEDURE — G8417 CALC BMI ABV UP PARAM F/U: HCPCS | Performed by: NURSE PRACTITIONER

## 2023-08-01 PROCEDURE — 1036F TOBACCO NON-USER: CPT | Performed by: NURSE PRACTITIONER

## 2023-08-01 RX ORDER — FUROSEMIDE 20 MG/1
20 TABLET ORAL 2 TIMES DAILY
Qty: 180 TABLET | Refills: 1 | Status: SHIPPED | OUTPATIENT
Start: 2023-08-01

## 2023-08-01 RX ORDER — POTASSIUM CHLORIDE 20 MEQ/1
20 TABLET, EXTENDED RELEASE ORAL 2 TIMES DAILY
Qty: 180 TABLET | Refills: 1 | Status: SHIPPED | OUTPATIENT
Start: 2023-08-01

## 2023-08-01 ASSESSMENT — ENCOUNTER SYMPTOMS: SHORTNESS OF BREATH: 1

## 2023-08-01 NOTE — PROGRESS NOTES
(H) 04/23/2012     Lab Results   Component Value Date    ALT 35 11/18/2020    AST 68 (H) 11/18/2020     TSH:  No results found for: TSH    Vitals:    08/01/23 1441   BP: 112/80   Pulse: 82         Echo:1/20/22   Left ventricular systolic function is normal with an ejection fraction of   55-60%. Grade I diastolic dysfunction. Mild to moderate septal wall asymmetrical left ventricular hypertrophy. Sclerotic, but non-stenotic aortic valve. Elevated LVOT velocities. Right ventricular systolic pressure of 48 mmHg consistent with mild to   moderate pulmonary hypertension. No evidence of pericardial effusion. Echo:6/14/22 OSVALDO   There is no evidence of any pericardial effusion. No evidence of thrombus within the left atrial appendage. Negative bubble study; no ASD or PFO noted. Restricted motion of the left coronary cusp of the aortic valve. Sigmoid shaped septum noted; however, no evidence of CAYLA. Peak LVOT   velocity: 114cm/s. No evidence of HOCM. There is no evidence of any pericardial effusion. Echo:2/7/23  Left ventricular function and size is normal, EF is estimated at 55-60%. Sigmoid shaped septum noted. Mild left ventricular hypertrophy. No regional wall motion abnormalities were detected. Mildly dilated left atrium. Calcified non coronary cusp of the aortic valve. Elevated LVOT velocities noted. Mild mitral , tricuspid and pulmonic regurgitation is present. Mild Pulmonary hypertension with RVSP of 43mmHg. No evidence of pericardial effusion. Stress Test:1/23/23  No ischemia     Cath:8/30/18  normal coronaries    moderate pulmonary HTN    MILDLY ELEVATED PCWP       The ASCVD Risk score (Nyla DK, et al., 2019) failed to calculate for the following reasons:    Cannot find a previous HDL lab    Cannot find a previous total cholesterol lab      Assessment/ Plan:     Venous reflux   - Significant reflux of right CFV. Recommend compression stockings.   Continue with Lasix

## 2024-04-12 DIAGNOSIS — I50.30 DIASTOLIC CONGESTIVE HEART FAILURE, UNSPECIFIED HF CHRONICITY (HCC): ICD-10-CM

## 2024-04-12 RX ORDER — FUROSEMIDE 20 MG/1
20 TABLET ORAL 2 TIMES DAILY
Qty: 180 TABLET | Refills: 1 | Status: SHIPPED | OUTPATIENT
Start: 2024-04-12

## 2024-04-12 RX ORDER — POTASSIUM CHLORIDE 20 MEQ/1
20 TABLET, EXTENDED RELEASE ORAL 2 TIMES DAILY
Qty: 180 TABLET | Refills: 1 | Status: SHIPPED | OUTPATIENT
Start: 2024-04-12

## (undated) DEVICE — TUBING, SUCTION, 9/32" X 10', STRAIGHT: Brand: MEDLINE

## (undated) DEVICE — LINER SUCT CANSTR 1500CC SEMI RIG W/ POR HYDROPHOBIC SHUT

## (undated) DEVICE — JELLY LUBRICATING 3 GM BACTERIOSTATIC

## (undated) DEVICE — BRUSH CLN DIA5MM NYL SGL END CBL ASST FLEX DSTL TIP POLYPR

## (undated) DEVICE — YANKAUER,FLEXIBLE HANDLE,REGLR CAPACITY: Brand: MEDLINE INDUSTRIES, INC.

## (undated) DEVICE — Z DISCONTINUED (USE MFG CAT MVABO)  TUBING GAS SAMPLING STD 6.5 FT FEMALE CONN SMRT CAPNOLINE

## (undated) DEVICE — TUBING, SUCTION, 3/16" X 6', STRAIGHT: Brand: MEDLINE